# Patient Record
Sex: FEMALE | Race: WHITE | NOT HISPANIC OR LATINO | Employment: OTHER | URBAN - NONMETROPOLITAN AREA
[De-identification: names, ages, dates, MRNs, and addresses within clinical notes are randomized per-mention and may not be internally consistent; named-entity substitution may affect disease eponyms.]

---

## 2017-03-24 ENCOUNTER — HOSPITAL ENCOUNTER (INPATIENT)
Facility: HOSPITAL | Age: 77
LOS: 3 days | Discharge: HOME | End: 2017-03-27
Attending: INTERNAL MEDICINE | Admitting: INTERNAL MEDICINE

## 2017-03-24 DIAGNOSIS — I25.10 CORONARY ARTERY DISEASE INVOLVING NATIVE CORONARY ARTERY OF NATIVE HEART, ANGINA PRESENCE UNSPECIFIED: ICD-10-CM

## 2017-03-24 DIAGNOSIS — I21.4 NSTEMI (NON-ST ELEVATED MYOCARDIAL INFARCTION) (HCC): Primary | ICD-10-CM

## 2017-03-24 PROBLEM — I10 HTN (HYPERTENSION): Status: ACTIVE | Noted: 2017-03-24

## 2017-03-24 PROBLEM — I48.0 PAF (PAROXYSMAL ATRIAL FIBRILLATION) (HCC): Status: ACTIVE | Noted: 2017-03-24

## 2017-03-24 PROBLEM — J44.9 COPD (CHRONIC OBSTRUCTIVE PULMONARY DISEASE) (HCC): Status: ACTIVE | Noted: 2017-03-24

## 2017-03-24 PROBLEM — J06.9 URI (UPPER RESPIRATORY INFECTION): Status: ACTIVE | Noted: 2017-03-24

## 2017-03-24 LAB
ANION GAP SERPL CALCULATED.3IONS-SCNC: 13 MMOL/L (ref 5–15)
ARTICHOKE IGE QN: <30 MG/DL (ref 1–129)
BUN BLD-MCNC: 20 MG/DL (ref 7–21)
BUN/CREAT SERPL: 26 (ref 7–25)
CALCIUM SPEC-SCNC: 8.5 MG/DL (ref 8.4–10.2)
CHLORIDE SERPL-SCNC: 101 MMOL/L (ref 95–110)
CHOLEST SERPL-MCNC: 80 MG/DL (ref 0–199)
CK MB SERPL-CCNC: 11.7 NG/ML (ref 0–5)
CK SERPL-CCNC: 76 U/L (ref 30–135)
CO2 SERPL-SCNC: 25 MMOL/L (ref 22–31)
CREAT BLD-MCNC: 0.77 MG/DL (ref 0.5–1)
DEPRECATED RDW RBC AUTO: 43.8 FL (ref 36.4–46.3)
ERYTHROCYTE [DISTWIDTH] IN BLOOD BY AUTOMATED COUNT: 13.4 % (ref 11.5–14.5)
GFR SERPL CREATININE-BSD FRML MDRD: 73 ML/MIN/1.73 (ref 60–90)
GLUCOSE BLD-MCNC: 109 MG/DL (ref 60–100)
HBA1C MFR BLD: 6.26 % (ref 4–5.6)
HCT VFR BLD AUTO: 36.2 % (ref 35–45)
HDLC SERPL-MCNC: 43 MG/DL (ref 60–200)
HGB BLD-MCNC: 11.9 G/DL (ref 12–15.5)
LDLC/HDLC SERPL: ABNORMAL {RATIO} (ref 0–3.22)
MCH RBC QN AUTO: 29.8 PG (ref 26.5–34)
MCHC RBC AUTO-ENTMCNC: 32.9 G/DL (ref 31.4–36)
MCV RBC AUTO: 90.7 FL (ref 80–98)
NT-PROBNP SERPL-MCNC: 2670 PG/ML (ref 0–1800)
PLATELET # BLD AUTO: 194 10*3/MM3 (ref 150–450)
PMV BLD AUTO: 10.3 FL (ref 8–12)
POTASSIUM BLD-SCNC: 4.1 MMOL/L (ref 3.5–5.1)
RBC # BLD AUTO: 3.99 10*6/MM3 (ref 3.77–5.16)
SODIUM BLD-SCNC: 139 MMOL/L (ref 137–145)
TRIGL SERPL-MCNC: 161 MG/DL (ref 20–199)
TROPONIN I SERPL-MCNC: 1.45 NG/ML
TROPONIN I SERPL-MCNC: 1.94 NG/ML
TROPONIN I SERPL-MCNC: 2.29 NG/ML
WBC NRBC COR # BLD: 11.38 10*3/MM3 (ref 3.2–9.8)

## 2017-03-24 PROCEDURE — 80061 LIPID PANEL: CPT | Performed by: INTERNAL MEDICINE

## 2017-03-24 PROCEDURE — 93010 ELECTROCARDIOGRAM REPORT: CPT | Performed by: INTERNAL MEDICINE

## 2017-03-24 PROCEDURE — 85027 COMPLETE CBC AUTOMATED: CPT | Performed by: INTERNAL MEDICINE

## 2017-03-24 PROCEDURE — 83036 HEMOGLOBIN GLYCOSYLATED A1C: CPT | Performed by: INTERNAL MEDICINE

## 2017-03-24 PROCEDURE — 82550 ASSAY OF CK (CPK): CPT | Performed by: INTERNAL MEDICINE

## 2017-03-24 PROCEDURE — 82553 CREATINE MB FRACTION: CPT | Performed by: INTERNAL MEDICINE

## 2017-03-24 PROCEDURE — 93005 ELECTROCARDIOGRAM TRACING: CPT | Performed by: INTERNAL MEDICINE

## 2017-03-24 PROCEDURE — 25010000002 ENOXAPARIN PER 10 MG: Performed by: INTERNAL MEDICINE

## 2017-03-24 PROCEDURE — 80048 BASIC METABOLIC PNL TOTAL CA: CPT | Performed by: INTERNAL MEDICINE

## 2017-03-24 PROCEDURE — 83880 ASSAY OF NATRIURETIC PEPTIDE: CPT | Performed by: INTERNAL MEDICINE

## 2017-03-24 PROCEDURE — 84484 ASSAY OF TROPONIN QUANT: CPT | Performed by: INTERNAL MEDICINE

## 2017-03-24 RX ORDER — AMOXICILLIN AND CLAVULANATE POTASSIUM 875; 125 MG/1; MG/1
1 TABLET, FILM COATED ORAL 2 TIMES DAILY
COMMUNITY
Start: 2017-03-18 | End: 2017-03-27 | Stop reason: HOSPADM

## 2017-03-24 RX ORDER — ATORVASTATIN CALCIUM 20 MG/1
20 TABLET, FILM COATED ORAL NIGHTLY
COMMUNITY

## 2017-03-24 RX ORDER — MORPHINE SULFATE 2 MG/ML
1 INJECTION, SOLUTION INTRAMUSCULAR; INTRAVENOUS EVERY 4 HOURS PRN
Status: DISCONTINUED | OUTPATIENT
Start: 2017-03-24 | End: 2017-03-27 | Stop reason: HOSPADM

## 2017-03-24 RX ORDER — TRAZODONE HYDROCHLORIDE 100 MG/1
100 TABLET ORAL NIGHTLY
COMMUNITY

## 2017-03-24 RX ORDER — BUDESONIDE AND FORMOTEROL FUMARATE DIHYDRATE 160; 4.5 UG/1; UG/1
2 AEROSOL RESPIRATORY (INHALATION)
COMMUNITY
End: 2018-10-31

## 2017-03-24 RX ORDER — ASPIRIN 81 MG/1
81 TABLET ORAL DAILY
Status: DISCONTINUED | OUTPATIENT
Start: 2017-03-24 | End: 2017-03-27 | Stop reason: HOSPADM

## 2017-03-24 RX ORDER — TRAZODONE HYDROCHLORIDE 100 MG/1
100 TABLET ORAL NIGHTLY
Status: DISCONTINUED | OUTPATIENT
Start: 2017-03-24 | End: 2017-03-27 | Stop reason: HOSPADM

## 2017-03-24 RX ORDER — POTASSIUM CITRATE 10 MEQ/1
10 TABLET, EXTENDED RELEASE ORAL 2 TIMES DAILY
COMMUNITY

## 2017-03-24 RX ORDER — BUDESONIDE AND FORMOTEROL FUMARATE DIHYDRATE 160; 4.5 UG/1; UG/1
2 AEROSOL RESPIRATORY (INHALATION)
Status: DISCONTINUED | OUTPATIENT
Start: 2017-03-24 | End: 2017-03-27 | Stop reason: HOSPADM

## 2017-03-24 RX ORDER — AMOXICILLIN AND CLAVULANATE POTASSIUM 875; 125 MG/1; MG/1
1 TABLET, FILM COATED ORAL EVERY 12 HOURS SCHEDULED
Status: COMPLETED | OUTPATIENT
Start: 2017-03-24 | End: 2017-03-26

## 2017-03-24 RX ORDER — NALOXONE HCL 0.4 MG/ML
0.4 VIAL (ML) INJECTION
Status: DISCONTINUED | OUTPATIENT
Start: 2017-03-24 | End: 2017-03-27 | Stop reason: HOSPADM

## 2017-03-24 RX ORDER — PREDNISONE 10 MG/1
10 TABLET ORAL DAILY
COMMUNITY
Start: 2017-03-18 | End: 2017-03-27 | Stop reason: HOSPADM

## 2017-03-24 RX ORDER — ASPIRIN 81 MG/1
81 TABLET ORAL DAILY
COMMUNITY
End: 2018-10-31

## 2017-03-24 RX ORDER — ATORVASTATIN CALCIUM 20 MG/1
20 TABLET, FILM COATED ORAL NIGHTLY
Status: DISCONTINUED | OUTPATIENT
Start: 2017-03-24 | End: 2017-03-24

## 2017-03-24 RX ORDER — POTASSIUM CITRATE 10 MEQ/1
10 TABLET, EXTENDED RELEASE ORAL DAILY
Status: DISCONTINUED | OUTPATIENT
Start: 2017-03-24 | End: 2017-03-27 | Stop reason: HOSPADM

## 2017-03-24 RX ORDER — SODIUM CHLORIDE 0.9 % (FLUSH) 0.9 %
1-10 SYRINGE (ML) INJECTION AS NEEDED
Status: DISCONTINUED | OUTPATIENT
Start: 2017-03-24 | End: 2017-03-27 | Stop reason: HOSPADM

## 2017-03-24 RX ORDER — ATORVASTATIN CALCIUM 40 MG/1
40 TABLET, FILM COATED ORAL NIGHTLY
Status: DISCONTINUED | OUTPATIENT
Start: 2017-03-24 | End: 2017-03-27 | Stop reason: HOSPADM

## 2017-03-24 RX ORDER — ALBUTEROL SULFATE 90 UG/1
2 AEROSOL, METERED RESPIRATORY (INHALATION) EVERY 4 HOURS PRN
COMMUNITY
End: 2018-10-31

## 2017-03-24 RX ORDER — PREDNISONE 10 MG/1
10 TABLET ORAL DAILY
Status: DISCONTINUED | OUTPATIENT
Start: 2017-03-24 | End: 2017-03-27 | Stop reason: HOSPADM

## 2017-03-24 RX ADMIN — AMOXICILLIN AND CLAVULANATE POTASSIUM 1 TABLET: 875; 125 TABLET, FILM COATED ORAL at 08:35

## 2017-03-24 RX ADMIN — ATORVASTATIN CALCIUM 40 MG: 40 TABLET, FILM COATED ORAL at 21:03

## 2017-03-24 RX ADMIN — AMOXICILLIN AND CLAVULANATE POTASSIUM 1 TABLET: 875; 125 TABLET, FILM COATED ORAL at 21:02

## 2017-03-24 RX ADMIN — TRAZODONE HYDROCHLORIDE 100 MG: 100 TABLET ORAL at 21:02

## 2017-03-24 RX ADMIN — METOPROLOL TARTRATE 25 MG: 25 TABLET ORAL at 21:03

## 2017-03-24 RX ADMIN — ASPIRIN 81 MG: 81 TABLET, COATED ORAL at 08:35

## 2017-03-24 RX ADMIN — ENOXAPARIN SODIUM 70 MG: 80 INJECTION SUBCUTANEOUS at 08:36

## 2017-03-24 RX ADMIN — PREDNISONE 10 MG: 10 TABLET ORAL at 08:39

## 2017-03-24 RX ADMIN — BUDESONIDE AND FORMOTEROL FUMARATE DIHYDRATE 2 PUFF: 160; 4.5 AEROSOL RESPIRATORY (INHALATION) at 21:02

## 2017-03-24 RX ADMIN — POTASSIUM CITRATE 10 MEQ: 10 TABLET ORAL at 08:38

## 2017-03-24 RX ADMIN — Medication 12.5 MG: at 08:36

## 2017-03-24 RX ADMIN — BUDESONIDE AND FORMOTEROL FUMARATE DIHYDRATE 2 PUFF: 160; 4.5 AEROSOL RESPIRATORY (INHALATION) at 08:36

## 2017-03-24 NOTE — H&P
[facility] Medicine Admission      Date of Admission: 3/24/2017      Primary Care Physician: Abdoul Castro MD    Chief Complaint: chest pain     HPI:This ia 76 y old white female who was sent to us from [facility] for  Elevated troponins   She went to their facility due to chest pain 10/10 in intensity .pain started the day of the admission  With  Radiation to her shoulders ,she describes it as a stabbing pain   Pain resolved with NTG  She was found to be in rapid a fib got some IV cardizem and converted to sinus rhythm however her troponin was elevated .she does have a hx of CAD and had a CABG in the past around 1990       Past Medical History: CAD,COPD ,HTN,DEpression ,HLD    Past Surgical History: CABG    Family History: Positive for CAD and HTN    Social History:  does not smoke  Quit 40 y ago . No alcohol intake No illicit drug use   Retired   Lives with a friend     Allergies:   Allergies   Allergen Reactions   • Codeine Palpitations and Rash       Medications: Scheduled Meds:  aspirin 81 mg Oral Daily   atorvastatin 40 mg Oral Nightly   metoprolol tartrate 25 mg Oral Q12H     Continuous Infusions:   PRN Meds:.Morphine **AND** naloxone  •  sodium chloride            Review of Systems:  Review of Systems   Constitutional: Negative for activity change, appetite change, chills, diaphoresis, fatigue, fever and unexpected weight change.   HENT: Negative.  Negative for congestion, dental problem, drooling, ear discharge, ear pain, facial swelling, hearing loss, mouth sores, nosebleeds, postnasal drip, rhinorrhea, sinus pressure, sneezing, tinnitus, trouble swallowing and voice change.    Eyes: Negative for photophobia and discharge.   Respiratory: Positive for chest tightness. Negative for apnea, cough, choking, shortness of breath, wheezing and stridor.    Cardiovascular: Negative for chest pain, palpitations and leg swelling.   Gastrointestinal:  Negative for abdominal pain, anal bleeding, blood in stool, constipation, diarrhea, nausea, rectal pain and vomiting.   Endocrine: Negative for cold intolerance, heat intolerance, polydipsia, polyphagia and polyuria.   Genitourinary: Negative for dysuria, enuresis, frequency, hematuria and urgency.   Musculoskeletal: Negative for arthralgias, back pain, joint swelling, myalgias, neck pain and neck stiffness.   Skin: Negative for color change, pallor, rash and wound.   Allergic/Immunologic: Negative for food allergies and immunocompromised state.   Neurological: Negative for dizziness, tremors, seizures, syncope, facial asymmetry, speech difficulty, weakness, light-headedness, numbness and headaches.   Hematological: Negative for adenopathy.   Psychiatric/Behavioral: Negative for agitation, behavioral problems, confusion, hallucinations, sleep disturbance and suicidal ideas. The patient is not nervous/anxious.       Otherwise complete ROS is negative except as mentioned above.    Physical Exam:      Physical Exam   Constitutional: She is oriented to person, place, and time. She appears well-developed and well-nourished.  Non-toxic appearance. She does not have a sickly appearance. She does not appear ill. No distress. She is not intubated.   HENT:   Head: Normocephalic and atraumatic.   Right Ear: External ear normal.   Left Ear: External ear normal.   Nose: Nose normal.   Mouth/Throat: Oropharynx is clear and moist. No oropharyngeal exudate.   Eyes: Conjunctivae and EOM are normal. Pupils are equal, round, and reactive to light. Right eye exhibits no discharge and no exudate. Left eye exhibits no discharge and no exudate. Right conjunctiva is not injected. Right conjunctiva has no hemorrhage. Left conjunctiva is not injected. Left conjunctiva has no hemorrhage. No scleral icterus.   Neck: Normal range of motion. Neck supple. No hepatojugular reflux and no JVD present. No tracheal tenderness, no spinous process  tenderness and no muscular tenderness present. Carotid bruit is not present. No rigidity. No tracheal deviation, no edema, no erythema and normal range of motion present. No thyroid mass and no thyromegaly present.   Cardiovascular: Normal rate, regular rhythm, normal heart sounds and intact distal pulses.   No extrasystoles are present. Exam reveals no gallop and no friction rub.    No murmur heard.  Pulmonary/Chest: Effort normal and breath sounds normal. No stridor. She is not intubated. No respiratory distress. She has no decreased breath sounds. She has no wheezes. She has no rhonchi. She has no rales. She exhibits no tenderness.   Abdominal: Soft. Normal appearance and bowel sounds are normal. She exhibits no shifting dullness, no distension, no pulsatile liver, no fluid wave, no abdominal bruit, no ascites, no pulsatile midline mass and no mass. There is no hepatosplenomegaly, splenomegaly or hepatomegaly. There is no tenderness. There is no rigidity, no rebound, no guarding, no CVA tenderness, no tenderness at McBurney's point and negative Mullins's sign. No hernia.   Genitourinary: Rectal exam shows guaiac negative stool.   Musculoskeletal: Normal range of motion. She exhibits no edema, tenderness or deformity.        Right shoulder: She exhibits no swelling.      Skin Integrity  -   She hasno right foot ulcer, non-callous right foot, no right foot warmth and no right foot blister. She has no left foot ulcer, non-callous left foot, no left foot warmth and no left foot blister..  Lymphadenopathy:     She has no cervical adenopathy.     She has no axillary adenopathy.   Neurological: She is alert and oriented to person, place, and time. She has normal strength and normal reflexes. She is not disoriented. She displays no atrophy, no tremor and normal reflexes. No cranial nerve deficit or sensory deficit. She exhibits normal muscle tone. She displays no seizure activity. Coordination and gait normal. She  displays no Babinski's sign on the right side. She displays no Babinski's sign on the left side.   Skin: Skin is warm and dry. No abrasion, no bruising, no burn, no ecchymosis, no laceration, no lesion, no purpura and no rash noted. Rash is not macular, not papular, not maculopapular, not nodular, not pustular, not vesicular and not urticarial. She is not diaphoretic. No cyanosis or erythema. No pallor. Nails show no clubbing.   Psychiatric: She has a normal mood and affect. Her behavior is normal. Judgment and thought content normal. Her mood appears not anxious. Her affect is not angry, not blunt, not labile and not inappropriate. Her speech is not slurred. She is not agitated, not aggressive, not hyperactive, not slowed, not withdrawn and not combative. Thought content is not paranoid and not delusional. Cognition and memory are not impaired. She does not express impulsivity or inappropriate judgment. She does not exhibit a depressed mood. She expresses no homicidal and no suicidal ideation. She expresses no suicidal plans and no homicidal plans. She is communicative. She exhibits normal recent memory and normal remote memory.         Results Reviewed:  I have personally reviewed current lab, radiology, and data and agree with results.  Lab Results (last 24 hours)     ** No results found for the last 24 hours. **        Imaging Results (last 24 hours)     ** No results found for the last 24 hours. **            Assessment:    NSTEMI  RApid afib  Now in sinus rhythm  Copd   Hx of CAD  htn  HLD          Plan:  Admit to telemtry   Aspirin   metoprolol po   1 dose of lovenox  Cardiology consult   Repeat EKG  Echo  3 sets of cardiac enzymes  Morphine sulfate for pain   Will continue rest of her medicine as the medical course dictates       I discussed the patients findings and my recommendations with: patient     Sam Pina MD  03/24/17  3:58 AM

## 2017-03-24 NOTE — CONSULTS
Cardiology Consultation Note.        Patient Name: Gabrielle Cherry  Age/Sex: 76 y.o. female  : 1940  MRN: [id number]    Date of consultation: 3/24/2017  Consulting Physician: Scott Combs MD  Primary care Physician: Abdoul Castro MD  Requesting Physician:   Sam Pina MD  Reason for consultation:  Chest pain abnormal resting electrocardiogram positive troponin suggestive for non-Q myocardial infarction.  With history of coronary artery bypass grafting.  And paroxysmal A. fib      Subjective:       Chief Complaint: Chest pain    History of Present Illness:  Gabrielle Cherry is a 76 y.o. female     Body mass index is 36.38 kg/(m^2). with a past medical history significant for atherosclerotic coronary artery disease, aborted myocardial infarction in  with a rescue PTCA, coronary artery bypass grafting done in  in [place] with four-vessel bypass grafting, arterial hypertension, hypertensive heart disease, rheumatoid arthritis, chronic obstructive lung disease with previous tobacco abuse, anxiety depression, arthritis,.  Patient had relocated from [facility] in 2016.  Patient has had previous cardiac evaluation in [place].  Patient last coronary angiogram done was 2 years ago.  Patient had not had any recent cardiac intervention.    Patient presented to [facility] with symptoms of chest pain.  Patient was scheduled for outpatient evaluation on Friday.  Prior to the outpatient cardiac evaluation and patient on Thursday night had symptoms of substernal chest discomfort.  Patient describes the chest pain which was worse than what she had experienced prior to her bypass grafting.  Patient on initial presentation to [facility] was found to be in atrial fibrillation which had spontaneously converted to normal sinus rhythm.  Patient was found to have a positive troponin was subsequently transferred to [facility].    Patient resting  electrocardiogram done reveals sinus rhythm with lateral ST-T wave changes.    Patient on questioning complained of having symptoms of chest pain shortness of breath.  Patient denies any symptoms of palpitation.  Patient denies any previous history of documented atrial fibrillation.  In symptom complex patient was hospitalized.  Patient subsequently had repeat troponin which was trending down and patient had not had any further recurrent symptoms of chest pain.    Patient 10 point review of system except for what is stated in the history of present illness is negative        Past Medical History:  1.  Chest pain abnormal resting electrocardiogram with elevated troponin suggestive for non-Q myocardial infarction.  2.  Atherosclerotic coronary artery disease.  3.  Aborted myocardial infarction in 1999 done in [facility]  4.  Coronary artery bypass grafting done in 2004 with four-vessel bypass grafting records of which are unavailable at the present time  5.  Arterial hypertension.  6.  Hypertensive heart disease.  7.  Rheumatoid arthritis.  8.  Paroxysmal atrial fibrillation currently in normal sinus rhythm.  9.  Previous history of tobacco and heavy alcohol abuse.  10.  Arthritis.  11.  Hyperlipidemia.    Past Surgical History:  1.  Coronary artery bypass grafting done in 2004 in [facility] with four-vessel bypass grafting.  2.  Right total hip arthroplasty.  3.  Right total knee replacement.  4.  Bilateral rotator cuff repair.  5.  Colonoscopy.    Family History: Family history strongly positive for atherosclerotic coronary artery disease cerebrovascular accident and diabetes.  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  Social History     Social History   • Marital status:      Spouse name: N/A   • Number of children: N/A   • Years of education: N/A     Occupational History   • Not on file.     Social History Main Topics   • Smoking status: Former Smoker     Packs/day: 0.25     Years: 15.00     Types: Cigarettes     Quit date: 3/24/1980   • Smokeless tobacco: Not on file   • Alcohol use No      Comment: Use to drink but had treatment and stopped 1970s   • Drug use: No   • Sexual activity: No     Other Topics Concern   • Not on file     Social History Narrative   • No narrative on file        Cardiac Risk factor: Post menopausal., Hypertension, Hyperlipidemia, Tobacco abuse, Family history  and Obesity      Allergies:  Allergies   Allergen Reactions   • Codeine Palpitations and Rash   • Iodinated Diagnostic Agents Shortness Of Breath and Palpitations       Medication::  Prescriptions Prior to Admission   Medication Sig Dispense Refill Last Dose   • amoxicillin-clavulanate (AUGMENTIN) 875-125 MG per tablet Take 1 tablet by mouth 2 (Two) Times a Day. States was given by lung specialist, Dr. Aguila   3/23/2017 at Unknown time   • aspirin 81 MG EC tablet Take 81 mg by mouth Daily.   3/23/2017 at Unknown time   • atorvastatin (LIPITOR) 20 MG tablet Take 20 mg by mouth Every Night. Take 2 tablets every night for cholesterol   3/23/2017 at Unknown time   • budesonide-formoterol (SYMBICORT) 160-4.5 MCG/ACT inhaler Inhale 2 puffs 2 (Two) Times a Day.   3/23/2017 at Unknown time   • metoprolol tartrate (LOPRESSOR) 25 MG tablet Take 25 mg by mouth Daily. 1/2 tablet   3/23/2017 at Unknown time   • potassium citrate (UROCIT-K) 10 MEQ (1080 MG) CR tablet Take 10 mEq by mouth Daily. States for kidney stones.  Ordered twice daily but states was only taken once daily   3/23/2017 at Unknown time   • predniSONE (DELTASONE) 10 MG tablet Take 10 mg by mouth Daily. Take 4 tablets every day for 3 days, then 3 tablets everyday for 3  days,  2 tablets for 3 days, then 1 tablet every day for 3 days   3/23/2017 at Unknown time   • traZODone (DESYREL) 100 MG tablet Take 100 mg by mouth Every Night. States for depression and sleep aide   3/23/2017 at Unknown time   • albuterol (PROVENTIL HFA;VENTOLIN HFA) 108 (90 BASE) MCG/ACT inhaler Inhale 2 puffs Every 4 (Four) Hours As Needed for Wheezing.   Unknown at Unknown time           Review of Systems:       Constitutional:  Denies recent weight loss, weight gain, fever or chills, no change in exercise tolerance     HENT:  Denies any hearing loss, epistaxis, hoarseness, or difficulty speaking.     Eyes: Wears eyeglasses or contact lenses     Respiratory:  Denies dyspnea with exertion,no cough, wheezing, or hemoptysis.     Cardiovascular: Positive for chest pain.  Negative for palpations, orthopnea, PND, peripheral edema, syncope, or claudication.     Gastrointestinal:  Denies change in bowel habits, dyspepsia, ulcer disease, hematochezia, or melena.  No nausea, no vomiting, no hematemesis, no diarrhea or constipation, no melena      Endocrine: Negative for cold intolerance, heat intolerance, polydipsia, polyphagia and polyuria. Denies any history of weight change, heat/cold intolerance, polydipsia, polyuria     Genitourinary: Negative hor hematuria.      Musculoskeletal: Denies any history of arthritic symptoms or back problems .  No joint pain, joint stiffness, joint swelling, muscle pain, muscle weakness and neck pain    Skin:  Denies any change in hair or nails, rashes, or skin lesions.     Allergic/Immunologic: Negative.  Negative for environmental allergies, food allergies and immunocompromised state.     Neurological:  Denies any history of recurrent headaches, strokes, TIA, or seizure disorder.     Hematological: Denies excessive bleeding, easy bruising, fatigue, lymphadenopathy and petechiae or any bleeding disorders, or lymphadenopathy.     Psychiatric/Behavioral: Denies any history of  depression, substance abuse, or change in cognitive function. Denies any psychomotor reaction or tangential thought.  No depression, homicidal ideations and suicidal ideations    Endocrine: No frequent urination and nocturia, temperature intolerance, weight gain, unintended and weight loss, unintended            Objective:     Objective:  Vitals:    03/24/17 0737   BP: 164/68   Pulse: 70   Resp: 18   Temp: 97.5 °F (36.4 °C)   SpO2: 96%     .    Body mass index is 36.38 kg/(m^2).           Physical Exam:   General Appearance:    Alert, oriented, cooperative, in no acute distress   Head:    Normocephalic, atraumatic, without obvious abnormality   Eyes:           RON  Lids and lashes normal, conjunctivae and sclerae normal, no icterus, no pallor   Ears:    Ears appear intact with no abnormalities noted   Throat:   Mucous membranes pink and moist   Neck:   Supple, trachea midline, no carotid bruit, no organomegaly or JVD   Lungs:     Clear to auscultation and percussion, respirations regular, even and Unlabored. No wheezes, rales, rhonchi    Heart:    Regular rhythm and normal rate, normal S1 and S2, no            murmur, no gallop, no rub, no click   Abdomen:     Soft, non-tender, non-distended, no guarding, no rebound tenderness, Normal bowel sounds in all four quadrant, no masses, liver and spleen nonpalpable,    Genitalia:    Deferred   Extremities:   Moves all extremities well, no edema, no cyanosis, no              Redness, no clubbing   Pulses:   Pulses palpable and equal bilaterally   Skin:   Moist and warm. No bleeding, bruising or rash   Neurologic/Psychiatric:   Alert and oriented to person, place, and time.  Motor, power and tone in upper and lower extremity is grossly intact.  No focal neurological deficits. Normal cognitive function. No psychomotor reaction or tangential thought. No depression, homicidal ideations and suicidal ideations           Lab Review:       Results from last 7 days  Lab Units  03/24/17  0718   SODIUM mmol/L 139   POTASSIUM mmol/L 4.1   CHLORIDE mmol/L 101   TOTAL CO2 mmol/L 25.0   BUN mg/dL 20   CREATININE mg/dL 0.77   CALCIUM mg/dL 8.5   GLUCOSE mg/dL 109*       Results from last 7 days  Lab Units 03/24/17  0718   CK TOTAL U/L 76   TROPONIN I ng/mL 2.290*           Results from last 7 days  Lab Units 03/24/17  0641   WBC 10*3/mm3 11.38*   HEMOGLOBIN g/dL 11.9*   HEMATOCRIT % 36.2   PLATELETS 10*3/mm3 194               Results from last 7 days  Lab Units 03/24/17  0718   CHOLESTEROL mg/dL 80   TRIGLYCERIDES mg/dL 161   HDL CHOL mg/dL 43*           Invalid input(s):  T4,  FREET4    EKG:   ECG/EMG Results (last 24 hours)     Procedure Component Value Units Date/Time    ECG 12 Lead [[id number]] Collected:  03/24/17 0348     Updated:  03/24/17 0727    Narrative:       Test Reason : EKGBlood Pressure : **/** mmHGVent. Rate : 062 BPM     Atrial Rate : 062 BPM   P-R Int : 126 ms          QRS Dur : 076 ms    QT Int : 380 ms       P-R-T Axes : 068 041 050 degrees   QTc Int : 385 ms** Poor data quality, interpretation may   be adversely affectedSinus rhythm with premature ventricular complexes or fusion complexesST &  T wave abnormality, consider lateral ischemiaAbnormal ECGNo previous ECGs availableReferred By:  RN           Confirmed By:           Imaging:  Imaging Results (last 24 hours)     ** No results found for the last 24 hours. **          I personally viewed and interpreted the patient's EKG/Telemetry data.    Assessment:   1.  Chest pain with elevated troponin suggestive for non-Q myocardial infarction.  2.  Atherosclerotic coronary artery disease status post coronary artery bypass grafting done in 2004 with four-vessel bypass grafting in [facility]  3.  Arterial hypertension.  4.  Hypertensive heart disease.  5.  Hyperlipidemia.  6.  Paroxysmal atrial fibrillation currently in sinus rhythm  7.  Rheumatoid arthritis.        Plan:   1.  Chest pain.  Patient does have history of  documented atherosclerotic coronary artery disease with aborted myocardial infarction in 1999 with a rescue PTCA with subsequent restenosis in 2004 with diffuse triple-vessel coronary artery disease with four-vessel coronary artery bypass grafting done in [facility].  Patient now presents with symptoms of paroxysmal atrial fibrillation and chest pain with positive troponin.  Patient troponin had peaked to 2.6 with subsequent troponin I 1.4.  Patient resting electrocardiogram revealed lateral ST-T wave changes.  Patient at the present time has been recommended to undergo a coronary angiogram.  Patient is allergic to IVP dye and would be premedicated for the IVP dye allergy.  Risk-benefit treatment option for the coronary angiogram were discussed with the patient and an informed consent was obtained from the patient for the coronary angiogram.  Patient Mallampati score #2 patient ASA classification was #2.  Rest for minimal sedation was also discussed with the patient and an informed consent was obtained from the patient for the minimal sedation.  Due to the patient history of IVP dye allergy patient would be premedicated with Benadryl and 100 of hydrocortisone prior to the coronary angiogram  2.  Arterial hypertension.  Patient blood pressure is currently well controlled and patient would be continued on the present dose of the metoprolol.  Patient has been counseled to decrease his salt intake.  3.  Hypertensive heart disease.  Clinically at the present time patient is not in congestive heart failure.    4.  Hyperlipidemia.  Patient is currently on Lipitor 40 mg at bedtime.  5.  Paroxysmal atrial fibrillation.  Patient on initial presentation to [facility] was found to be in atrial fibrillation which had converted to normal sinus rhythm.  Patient has not complained of having any symptoms of palpitation.  Should the patient have recurrence of atrial fibrillation patient would be reevaluated for  consideration for anticoagulation.  6.  Rheumatoid arthritis.  Patient has been followed by the primary care physician.  7.  Obesity.  Patient has been counseled on weight reduction lifestyle modification and dietary restriction.        Time: time spent in face-to-face evaluation off greater than 60 minutes and interacting and formulating examining and discussing the plan with the patient with 50% of greater time spent in face-to-face interaction.    Scott Combs MD  03/24/17  9:36 AM      EMR Dragon/Transcription disclaimer:   Some of this note may be an electronic transcription/translation of spoken language to printed text. The electronic translation of spoken language may permit erroneous, or at times, nonsensical words or phrases to be inadvertently transcribed; Although I have reviewed the note for such errors, some may still exist.

## 2017-03-24 NOTE — PLAN OF CARE
Problem: Older Inpatient, Acutely Ill (Adult)  Goal: Signs and Symptoms of Listed Potential Problems Will be Absent or Manageable (Older Inpatient, Acutely Ill)  Outcome: Ongoing (interventions implemented as appropriate)

## 2017-03-24 NOTE — PROGRESS NOTES
[facility] Medicine Services  INPATIENT PROGRESS NOTE    Length of Stay: 0  Date of Admission: 3/24/2017  Primary Care Physician: Abdoul Castro MD    Subjective   Chief Complaint: Chest pain, currently pain free  HPI:  76 year old female transferred from [facility] with chest pain radiating to both shoulders with elevation in her troponin.      Review of Systems   Constitutional: Negative for chills and fever.   Respiratory: Positive for cough. Negative for shortness of breath and wheezing.    Cardiovascular: Negative for chest pain, palpitations and leg swelling.   Gastrointestinal: Negative for abdominal pain, diarrhea, nausea and vomiting.   Neurological: Negative for headaches.      All pertinent negatives and positives are as above. All other systems have been reviewed and are negative unless otherwise stated.     Objective    Temp:  [97.5 °F (36.4 °C)-97.9 °F (36.6 °C)] 97.5 °F (36.4 °C)  Heart Rate:  [61-70] 70  Resp:  [18-20] 18  BP: (146-164)/(68-75) 164/68    Physical Exam   Constitutional: She is oriented to person, place, and time. She appears well-developed and well-nourished.   HENT:   Head: Normocephalic and atraumatic.   Eyes: Conjunctivae and EOM are normal.   Neck: Normal range of motion. Neck supple.   Cardiovascular: Normal rate, regular rhythm, normal heart sounds and intact distal pulses.  Exam reveals no gallop and no friction rub.    No murmur heard.  Pulmonary/Chest: Effort normal and breath sounds normal. No respiratory distress. She has no wheezes. She has no rales.   Abdominal: Soft. Bowel sounds are normal. She exhibits no distension. There is no tenderness.   Musculoskeletal: Normal range of motion. She exhibits no edema.   Neurological: She is alert and oriented to person, place, and time.   Skin: Skin is warm and dry. No erythema.       Results Review:  I have reviewed the labs, radiology results, and diagnostic  studies.    Laboratory Data:     Results from last 7 days  Lab Units 03/24/17  0718   SODIUM mmol/L 139   POTASSIUM mmol/L 4.1   CHLORIDE mmol/L 101   TOTAL CO2 mmol/L 25.0   BUN mg/dL 20   CREATININE mg/dL 0.77   GLUCOSE mg/dL 109*   CALCIUM mg/dL 8.5   ANION GAP mmol/L 13.0     Estimated Creatinine Clearance: 50.9 mL/min (by C-G formula based on Cr of 0.77).            Results from last 7 days  Lab Units 03/24/17  0641   WBC 10*3/mm3 11.38*   HEMOGLOBIN g/dL 11.9*   HEMATOCRIT % 36.2   PLATELETS 10*3/mm3 194           Culture Data:   No results found for: BLOODCX  No results found for: URINECX  No results found for: RESPCX  No results found for: WOUNDCX  No results found for: STOOLCX  No components found for: BODYFLD    Radiology Data:   Imaging Results (last 24 hours)     ** No results found for the last 24 hours. **          I have reviewed the patient current medications.     Assessment/Plan     Hospital Problem List     * (Principal)NSTEMI (non-ST elevated myocardial infarction)    CAD (coronary artery disease)    COPD (chronic obstructive pulmonary disease)    HTN (hypertension)    PAF (paroxysmal atrial fibrillation)    URI (upper respiratory infection)          Plan:  ASA  Lopressor  Lipitor  Therapeutic lovenox  Cardiology consulted  Augmentin/Prednisone for URI/bronchitis      MANUEL Warren   03/24/17   11:41 AM      I personally evaluated and examined the patient in conjunction with MANUEL Carbajal and agree with the assessment, treatment plan, and disposition of the patient as recorded.    Chest pain resolved.  Lungs clear

## 2017-03-25 LAB
ANION GAP SERPL CALCULATED.3IONS-SCNC: 13 MMOL/L (ref 5–15)
BASOPHILS # BLD AUTO: 0 10*3/MM3 (ref 0–0.2)
BASOPHILS NFR BLD AUTO: 0 % (ref 0–2)
BUN BLD-MCNC: 22 MG/DL (ref 7–21)
BUN/CREAT SERPL: 27.5 (ref 7–25)
CALCIUM SPEC-SCNC: 8.8 MG/DL (ref 8.4–10.2)
CHLORIDE SERPL-SCNC: 101 MMOL/L (ref 95–110)
CO2 SERPL-SCNC: 25 MMOL/L (ref 22–31)
CREAT BLD-MCNC: 0.8 MG/DL (ref 0.5–1)
DEPRECATED RDW RBC AUTO: 45.3 FL (ref 36.4–46.3)
EOSINOPHIL # BLD AUTO: 0.01 10*3/MM3 (ref 0–0.7)
EOSINOPHIL NFR BLD AUTO: 0.1 % (ref 0–7)
ERYTHROCYTE [DISTWIDTH] IN BLOOD BY AUTOMATED COUNT: 13.8 % (ref 11.5–14.5)
GFR SERPL CREATININE-BSD FRML MDRD: 70 ML/MIN/1.73 (ref 39–90)
GLUCOSE BLD-MCNC: 93 MG/DL (ref 60–100)
HCT VFR BLD AUTO: 41.5 % (ref 35–45)
HGB BLD-MCNC: 13.8 G/DL (ref 12–15.5)
IMM GRANULOCYTES # BLD: 0.12 10*3/MM3 (ref 0–0.02)
IMM GRANULOCYTES NFR BLD: 1 % (ref 0–0.5)
LYMPHOCYTES # BLD AUTO: 1.49 10*3/MM3 (ref 0.6–4.2)
LYMPHOCYTES NFR BLD AUTO: 12.2 % (ref 10–50)
MCH RBC QN AUTO: 30.4 PG (ref 26.5–34)
MCHC RBC AUTO-ENTMCNC: 33.3 G/DL (ref 31.4–36)
MCV RBC AUTO: 91.4 FL (ref 80–98)
MONOCYTES # BLD AUTO: 0.82 10*3/MM3 (ref 0–0.9)
MONOCYTES NFR BLD AUTO: 6.7 % (ref 0–12)
NEUTROPHILS # BLD AUTO: 9.76 10*3/MM3 (ref 2–8.6)
NEUTROPHILS NFR BLD AUTO: 80 % (ref 37–80)
NRBC BLD MANUAL-RTO: 0 /100 WBC (ref 0–0)
PLATELET # BLD AUTO: 237 10*3/MM3 (ref 150–450)
PMV BLD AUTO: 10.1 FL (ref 8–12)
POTASSIUM BLD-SCNC: 4.6 MMOL/L (ref 3.5–5.1)
RBC # BLD AUTO: 4.54 10*6/MM3 (ref 3.77–5.16)
SODIUM BLD-SCNC: 139 MMOL/L (ref 137–145)
WBC NRBC COR # BLD: 12.2 10*3/MM3 (ref 3.2–9.8)

## 2017-03-25 PROCEDURE — 85025 COMPLETE CBC W/AUTO DIFF WBC: CPT | Performed by: NURSE PRACTITIONER

## 2017-03-25 PROCEDURE — 80048 BASIC METABOLIC PNL TOTAL CA: CPT | Performed by: NURSE PRACTITIONER

## 2017-03-25 PROCEDURE — 63710000001 PREDNISONE PER 5 MG: Performed by: INTERNAL MEDICINE

## 2017-03-25 RX ADMIN — ATORVASTATIN CALCIUM 40 MG: 40 TABLET, FILM COATED ORAL at 20:28

## 2017-03-25 RX ADMIN — TRAZODONE HYDROCHLORIDE 100 MG: 100 TABLET ORAL at 20:28

## 2017-03-25 RX ADMIN — PREDNISONE 10 MG: 10 TABLET ORAL at 08:47

## 2017-03-25 RX ADMIN — ASPIRIN 81 MG: 81 TABLET, COATED ORAL at 08:47

## 2017-03-25 RX ADMIN — AMOXICILLIN AND CLAVULANATE POTASSIUM 1 TABLET: 875; 125 TABLET, FILM COATED ORAL at 20:28

## 2017-03-25 RX ADMIN — AMOXICILLIN AND CLAVULANATE POTASSIUM 1 TABLET: 875; 125 TABLET, FILM COATED ORAL at 08:47

## 2017-03-25 RX ADMIN — POTASSIUM CITRATE 10 MEQ: 10 TABLET ORAL at 08:47

## 2017-03-25 NOTE — PLAN OF CARE
Problem: Older Inpatient, Acutely Ill (Adult)  Goal: Signs and Symptoms of Listed Potential Problems Will be Absent or Manageable (Older Inpatient, Acutely Ill)  Outcome: Ongoing (interventions implemented as appropriate)    Problem: Arrhythmia/Dysrhythmia (Symptomatic) (Adult)  Goal: Signs and Symptoms of Listed Potential Problems Will be Absent or Manageable (Arrhythmia/Dysrhythmia)  Outcome: Ongoing (interventions implemented as appropriate)    Problem: Patient Care Overview (Adult)  Goal: Plan of Care Review  Outcome: Ongoing (interventions implemented as appropriate)    03/24/17 0532 03/25/17 1826   Coping/Psychosocial Response Interventions   Plan Of Care Reviewed With patient --    Patient Care Overview   Progress no change --    Outcome Evaluation   Outcome Summary/Follow up Plan --  possible heart cath on Monday       Goal: Adult Individualization and Mutuality  Outcome: Ongoing (interventions implemented as appropriate)

## 2017-03-25 NOTE — PROGRESS NOTES
[facility] Medicine Services  INPATIENT PROGRESS NOTE    Length of Stay: 1  Date of Admission: 3/24/2017  Primary Care Physician: Abdoul Castro MD    Subjective   Chief Complaint: Chest pain, currently pain free  HPI:  76 year old female transferred from [facility] with chest pain radiating to both shoulders with elevation in her troponin.  She has been evaluated by cardiology and remains pain free.    Review of Systems   Constitutional: Negative for chills and fever.   Respiratory: Positive for cough. Negative for shortness of breath and wheezing.    Cardiovascular: Negative for chest pain, palpitations and leg swelling.   Gastrointestinal: Negative for abdominal pain, diarrhea, nausea and vomiting.   Neurological: Negative for headaches.      All pertinent negatives and positives are as above. All other systems have been reviewed and are negative unless otherwise stated.     Objective    Temp:  [96.6 °F (35.9 °C)-98.7 °F (37.1 °C)] 96.6 °F (35.9 °C)  Heart Rate:  [45-62] 50  Resp:  [18-20] 18  BP: (125-153)/(63-75) 125/75    Physical Exam   Constitutional: She is oriented to person, place, and time. She appears well-developed and well-nourished.   HENT:   Head: Normocephalic and atraumatic.   Eyes: Conjunctivae and EOM are normal.   Neck: Normal range of motion. Neck supple.   Cardiovascular: Normal rate, regular rhythm, normal heart sounds and intact distal pulses.  Exam reveals no gallop and no friction rub.    No murmur heard.  Pulmonary/Chest: Effort normal and breath sounds normal. No respiratory distress. She has no wheezes. She has no rales.   Abdominal: Soft. Bowel sounds are normal. She exhibits no distension. There is no tenderness.   Musculoskeletal: Normal range of motion. She exhibits no edema.   Neurological: She is alert and oriented to person, place, and time.   Skin: Skin is warm and dry. No erythema.       Results Review:  I have reviewed the  labs, radiology results, and diagnostic studies.    Laboratory Data:     Results from last 7 days  Lab Units 03/24/17  0718   SODIUM mmol/L 139   POTASSIUM mmol/L 4.1   CHLORIDE mmol/L 101   TOTAL CO2 mmol/L 25.0   BUN mg/dL 20   CREATININE mg/dL 0.77   GLUCOSE mg/dL 109*   CALCIUM mg/dL 8.5   ANION GAP mmol/L 13.0     Estimated Creatinine Clearance: 50.9 mL/min (by C-G formula based on Cr of 0.77).            Results from last 7 days  Lab Units 03/24/17  0641   WBC 10*3/mm3 11.38*   HEMOGLOBIN g/dL 11.9*   HEMATOCRIT % 36.2   PLATELETS 10*3/mm3 194           Culture Data:   No results found for: BLOODCX  No results found for: URINECX  No results found for: RESPCX  No results found for: WOUNDCX  No results found for: STOOLCX  No components found for: BODYFLD    Radiology Data:   Imaging Results (last 24 hours)     ** No results found for the last 24 hours. **          I have reviewed the patient current medications.     Assessment/Plan     Hospital Problem List     * (Principal)NSTEMI (non-ST elevated myocardial infarction)    CAD (coronary artery disease)    COPD (chronic obstructive pulmonary disease)    HTN (hypertension)    PAF (paroxysmal atrial fibrillation)    URI (upper respiratory infection)          Plan:  ASA  Lopressor  Lipitor  Therapeutic lovenox  Heart cath planned  Augmentin/Prednisone for URI/bronchitis      Keegan Benoit, APRN   03/25/17   11:17 AM

## 2017-03-25 NOTE — PLAN OF CARE
Problem: Patient Care Overview (Adult)  Goal: Plan of Care Review  Outcome: Ongoing (interventions implemented as appropriate)    03/24/17 0532 03/25/17 0535   Coping/Psychosocial Response Interventions   Plan Of Care Reviewed With patient --    Patient Care Overview   Progress no change --    Outcome Evaluation   Outcome Summary/Follow up Plan --  pt slept threw the night.

## 2017-03-26 LAB
ANION GAP SERPL CALCULATED.3IONS-SCNC: 12 MMOL/L (ref 5–15)
BASOPHILS # BLD AUTO: 0.01 10*3/MM3 (ref 0–0.2)
BASOPHILS NFR BLD AUTO: 0.1 % (ref 0–2)
BUN BLD-MCNC: 19 MG/DL (ref 7–21)
BUN/CREAT SERPL: 25.7 (ref 7–25)
CALCIUM SPEC-SCNC: 8.5 MG/DL (ref 8.4–10.2)
CHLORIDE SERPL-SCNC: 102 MMOL/L (ref 95–110)
CO2 SERPL-SCNC: 25 MMOL/L (ref 22–31)
CREAT BLD-MCNC: 0.74 MG/DL (ref 0.5–1)
DEPRECATED RDW RBC AUTO: 46.3 FL (ref 36.4–46.3)
EOSINOPHIL # BLD AUTO: 0.02 10*3/MM3 (ref 0–0.7)
EOSINOPHIL NFR BLD AUTO: 0.2 % (ref 0–7)
ERYTHROCYTE [DISTWIDTH] IN BLOOD BY AUTOMATED COUNT: 14.1 % (ref 11.5–14.5)
GFR SERPL CREATININE-BSD FRML MDRD: 76 ML/MIN/1.73 (ref 60–90)
GLUCOSE BLD-MCNC: 74 MG/DL (ref 60–100)
HCT VFR BLD AUTO: 40.4 % (ref 35–45)
HGB BLD-MCNC: 13.5 G/DL (ref 12–15.5)
IMM GRANULOCYTES # BLD: 0.11 10*3/MM3 (ref 0–0.02)
IMM GRANULOCYTES NFR BLD: 1.2 % (ref 0–0.5)
LYMPHOCYTES # BLD AUTO: 2.84 10*3/MM3 (ref 0.6–4.2)
LYMPHOCYTES NFR BLD AUTO: 30 % (ref 10–50)
MCH RBC QN AUTO: 30.3 PG (ref 26.5–34)
MCHC RBC AUTO-ENTMCNC: 33.4 G/DL (ref 31.4–36)
MCV RBC AUTO: 90.6 FL (ref 80–98)
MONOCYTES # BLD AUTO: 0.95 10*3/MM3 (ref 0–0.9)
MONOCYTES NFR BLD AUTO: 10 % (ref 0–12)
NEUTROPHILS # BLD AUTO: 5.53 10*3/MM3 (ref 2–8.6)
NEUTROPHILS NFR BLD AUTO: 58.5 % (ref 37–80)
NRBC BLD MANUAL-RTO: 0 /100 WBC (ref 0–0)
PLATELET # BLD AUTO: 231 10*3/MM3 (ref 150–450)
PMV BLD AUTO: 9.6 FL (ref 8–12)
POTASSIUM BLD-SCNC: 4.2 MMOL/L (ref 3.5–5.1)
RBC # BLD AUTO: 4.46 10*6/MM3 (ref 3.77–5.16)
SODIUM BLD-SCNC: 139 MMOL/L (ref 137–145)
WBC NRBC COR # BLD: 9.46 10*3/MM3 (ref 3.2–9.8)

## 2017-03-26 PROCEDURE — 80048 BASIC METABOLIC PNL TOTAL CA: CPT | Performed by: NURSE PRACTITIONER

## 2017-03-26 PROCEDURE — 94799 UNLISTED PULMONARY SVC/PX: CPT

## 2017-03-26 PROCEDURE — 85025 COMPLETE CBC W/AUTO DIFF WBC: CPT | Performed by: NURSE PRACTITIONER

## 2017-03-26 PROCEDURE — 63710000001 PREDNISONE PER 5 MG: Performed by: INTERNAL MEDICINE

## 2017-03-26 PROCEDURE — 94640 AIRWAY INHALATION TREATMENT: CPT

## 2017-03-26 PROCEDURE — 94760 N-INVAS EAR/PLS OXIMETRY 1: CPT

## 2017-03-26 RX ADMIN — AMOXICILLIN AND CLAVULANATE POTASSIUM 1 TABLET: 875; 125 TABLET, FILM COATED ORAL at 09:02

## 2017-03-26 RX ADMIN — POTASSIUM CITRATE 10 MEQ: 10 TABLET ORAL at 09:03

## 2017-03-26 RX ADMIN — BUDESONIDE AND FORMOTEROL FUMARATE DIHYDRATE 2 PUFF: 160; 4.5 AEROSOL RESPIRATORY (INHALATION) at 08:00

## 2017-03-26 RX ADMIN — TRAZODONE HYDROCHLORIDE 100 MG: 100 TABLET ORAL at 20:24

## 2017-03-26 RX ADMIN — ASPIRIN 81 MG: 81 TABLET, COATED ORAL at 09:02

## 2017-03-26 RX ADMIN — AMOXICILLIN AND CLAVULANATE POTASSIUM 1 TABLET: 875; 125 TABLET, FILM COATED ORAL at 20:24

## 2017-03-26 RX ADMIN — METOPROLOL TARTRATE 25 MG: 25 TABLET ORAL at 20:24

## 2017-03-26 RX ADMIN — BUDESONIDE AND FORMOTEROL FUMARATE DIHYDRATE 2 PUFF: 160; 4.5 AEROSOL RESPIRATORY (INHALATION) at 21:27

## 2017-03-26 RX ADMIN — PREDNISONE 10 MG: 10 TABLET ORAL at 09:02

## 2017-03-26 RX ADMIN — ATORVASTATIN CALCIUM 40 MG: 40 TABLET, FILM COATED ORAL at 20:24

## 2017-03-26 RX ADMIN — METOPROLOL TARTRATE 25 MG: 25 TABLET ORAL at 09:02

## 2017-03-26 NOTE — PROGRESS NOTES
[facility] Medicine Services  INPATIENT PROGRESS NOTE    Length of Stay: 2  Date of Admission: 3/24/2017  Primary Care Physician: Abdoul Castro MD    Subjective   Chief Complaint: No new complaints  HPI:  76 year old female transferred from [facility] with chest pain radiating to both shoulders with elevation in her troponin.  She has been evaluated by cardiology and remains pain free.  No new overnight issues.    Review of Systems   Constitutional: Negative for chills and fever.   Respiratory: Positive for cough. Negative for shortness of breath and wheezing.    Cardiovascular: Negative for chest pain, palpitations and leg swelling.   Gastrointestinal: Negative for abdominal pain, diarrhea, nausea and vomiting.   Neurological: Negative for headaches.      All pertinent negatives and positives are as above. All other systems have been reviewed and are negative unless otherwise stated.     Objective    Temp:  [96.7 °F (35.9 °C)-98.7 °F (37.1 °C)] 96.7 °F (35.9 °C)  Heart Rate:  [52-64] 64  Resp:  [18] 18  BP: (126-155)/(57-77) 141/61    Physical Exam   Constitutional: She is oriented to person, place, and time. She appears well-developed and well-nourished.   HENT:   Head: Normocephalic and atraumatic.   Eyes: Conjunctivae and EOM are normal.   Neck: Normal range of motion. Neck supple.   Cardiovascular: Normal rate, regular rhythm, normal heart sounds and intact distal pulses.  Exam reveals no gallop and no friction rub.    No murmur heard.  Pulmonary/Chest: Effort normal and breath sounds normal. No respiratory distress. She has no wheezes. She has no rales.   Abdominal: Soft. Bowel sounds are normal. She exhibits no distension. There is no tenderness.   Musculoskeletal: Normal range of motion. She exhibits no edema.   Neurological: She is alert and oriented to person, place, and time.   Skin: Skin is warm and dry. No erythema.       Results Review:  I have  reviewed the labs, radiology results, and diagnostic studies.    Laboratory Data:     Results from last 7 days  Lab Units 03/26/17  0610 03/25/17  1153 03/24/17  0718   SODIUM mmol/L 139 139 139   POTASSIUM mmol/L 4.2 4.6 4.1   CHLORIDE mmol/L 102 101 101   TOTAL CO2 mmol/L 25.0 25.0 25.0   BUN mg/dL 19 22* 20   CREATININE mg/dL 0.74 0.80 0.77   GLUCOSE mg/dL 74 93 109*   CALCIUM mg/dL 8.5 8.8 8.5   ANION GAP mmol/L 12.0 13.0 13.0     Estimated Creatinine Clearance: 50.4 mL/min (by C-G formula based on Cr of 0.74).            Results from last 7 days  Lab Units 03/26/17  0610 03/25/17  1153 03/24/17  0641   WBC 10*3/mm3 9.46 12.20* 11.38*   HEMOGLOBIN g/dL 13.5 13.8 11.9*   HEMATOCRIT % 40.4 41.5 36.2   PLATELETS 10*3/mm3 231 237 194           Culture Data:   No results found for: BLOODCX  No results found for: URINECX  No results found for: RESPCX  No results found for: WOUNDCX  No results found for: STOOLCX  No components found for: BODYFLD    Radiology Data:   Imaging Results (last 24 hours)     ** No results found for the last 24 hours. **          I have reviewed the patient current medications.     Assessment/Plan     Hospital Problem List     * (Principal)NSTEMI (non-ST elevated myocardial infarction)    CAD (coronary artery disease)    COPD (chronic obstructive pulmonary disease)    HTN (hypertension)    PAF (paroxysmal atrial fibrillation)    URI (upper respiratory infection)          Plan:  ASA  Lopressor  Lipitor  Heart cath planned in AM  Augmentin/Prednisone for URI/bronchitis      Keegan Benoit, APRN   03/26/17   10:52 AM

## 2017-03-26 NOTE — PLAN OF CARE
Problem: Older Inpatient, Acutely Ill (Adult)  Goal: Signs and Symptoms of Listed Potential Problems Will be Absent or Manageable (Older Inpatient, Acutely Ill)  Outcome: Ongoing (interventions implemented as appropriate)    Problem: Arrhythmia/Dysrhythmia (Symptomatic) (Adult)  Goal: Signs and Symptoms of Listed Potential Problems Will be Absent or Manageable (Arrhythmia/Dysrhythmia)  Outcome: Ongoing (interventions implemented as appropriate)    Problem: Patient Care Overview (Adult)  Goal: Plan of Care Review  Outcome: Ongoing (interventions implemented as appropriate)    03/24/17 0532 03/25/17 1826 03/26/17 0000   Coping/Psychosocial Response Interventions   Plan Of Care Reviewed With --  --  patient   Patient Care Overview   Progress no change --  --    Outcome Evaluation   Outcome Summary/Follow up Plan --  possible heart cath on Monday --

## 2017-03-26 NOTE — PLAN OF CARE
Problem: Older Inpatient, Acutely Ill (Adult)  Goal: Signs and Symptoms of Listed Potential Problems Will be Absent or Manageable (Older Inpatient, Acutely Ill)  Outcome: Ongoing (interventions implemented as appropriate)    Problem: Arrhythmia/Dysrhythmia (Symptomatic) (Adult)  Goal: Signs and Symptoms of Listed Potential Problems Will be Absent or Manageable (Arrhythmia/Dysrhythmia)  Outcome: Ongoing (interventions implemented as appropriate)    Problem: Patient Care Overview (Adult)  Goal: Plan of Care Review  Outcome: Ongoing (interventions implemented as appropriate)    03/24/17 0532 03/25/17 1826 03/26/17 0000   Coping/Psychosocial Response Interventions   Plan Of Care Reviewed With --  --  patient   Patient Care Overview   Progress no change --  --    Outcome Evaluation   Outcome Summary/Follow up Plan --  possible heart cath on Monday --        Goal: Adult Individualization and Mutuality  Outcome: Ongoing (interventions implemented as appropriate)

## 2017-03-27 VITALS
HEART RATE: 73 BPM | TEMPERATURE: 97.3 F | WEIGHT: 147 LBS | BODY MASS INDEX: 34.02 KG/M2 | OXYGEN SATURATION: 95 % | HEIGHT: 55 IN | DIASTOLIC BLOOD PRESSURE: 60 MMHG | SYSTOLIC BLOOD PRESSURE: 108 MMHG | RESPIRATION RATE: 20 BRPM

## 2017-03-27 LAB
ANION GAP SERPL CALCULATED.3IONS-SCNC: 12 MMOL/L (ref 5–15)
BASOPHILS # BLD AUTO: 0.01 10*3/MM3 (ref 0–0.2)
BASOPHILS NFR BLD AUTO: 0.1 % (ref 0–2)
BUN BLD-MCNC: 21 MG/DL (ref 7–21)
BUN/CREAT SERPL: 24.4 (ref 7–25)
CALCIUM SPEC-SCNC: 8.7 MG/DL (ref 8.4–10.2)
CHLORIDE SERPL-SCNC: 101 MMOL/L (ref 95–110)
CO2 SERPL-SCNC: 25 MMOL/L (ref 22–31)
CREAT BLD-MCNC: 0.86 MG/DL (ref 0.5–1)
DEPRECATED RDW RBC AUTO: 45.7 FL (ref 36.4–46.3)
EOSINOPHIL # BLD AUTO: 0.02 10*3/MM3 (ref 0–0.7)
EOSINOPHIL NFR BLD AUTO: 0.2 % (ref 0–7)
ERYTHROCYTE [DISTWIDTH] IN BLOOD BY AUTOMATED COUNT: 13.9 % (ref 11.5–14.5)
GFR SERPL CREATININE-BSD FRML MDRD: 64 ML/MIN/1.73 (ref 60–90)
GLUCOSE BLD-MCNC: 83 MG/DL (ref 60–100)
HCT VFR BLD AUTO: 40.1 % (ref 35–45)
HGB BLD-MCNC: 13.8 G/DL (ref 12–15.5)
IMM GRANULOCYTES # BLD: 0.11 10*3/MM3 (ref 0–0.02)
IMM GRANULOCYTES NFR BLD: 1.1 % (ref 0–0.5)
LYMPHOCYTES # BLD AUTO: 3.11 10*3/MM3 (ref 0.6–4.2)
LYMPHOCYTES NFR BLD AUTO: 30.7 % (ref 10–50)
MCH RBC QN AUTO: 31 PG (ref 26.5–34)
MCHC RBC AUTO-ENTMCNC: 34.4 G/DL (ref 31.4–36)
MCV RBC AUTO: 90.1 FL (ref 80–98)
MONOCYTES # BLD AUTO: 0.77 10*3/MM3 (ref 0–0.9)
MONOCYTES NFR BLD AUTO: 7.6 % (ref 0–12)
NEUTROPHILS # BLD AUTO: 6.11 10*3/MM3 (ref 2–8.6)
NEUTROPHILS NFR BLD AUTO: 60.3 % (ref 37–80)
PLATELET # BLD AUTO: 216 10*3/MM3 (ref 150–450)
PMV BLD AUTO: 10.2 FL (ref 8–12)
POTASSIUM BLD-SCNC: 3.8 MMOL/L (ref 3.5–5.1)
RBC # BLD AUTO: 4.45 10*6/MM3 (ref 3.77–5.16)
SODIUM BLD-SCNC: 138 MMOL/L (ref 137–145)
WBC NRBC COR # BLD: 10.13 10*3/MM3 (ref 3.2–9.8)

## 2017-03-27 PROCEDURE — C1894 INTRO/SHEATH, NON-LASER: HCPCS | Performed by: INTERNAL MEDICINE

## 2017-03-27 PROCEDURE — B2151ZZ FLUOROSCOPY OF LEFT HEART USING LOW OSMOLAR CONTRAST: ICD-10-PCS | Performed by: INTERNAL MEDICINE

## 2017-03-27 PROCEDURE — C1769 GUIDE WIRE: HCPCS | Performed by: INTERNAL MEDICINE

## 2017-03-27 PROCEDURE — 80048 BASIC METABOLIC PNL TOTAL CA: CPT | Performed by: NURSE PRACTITIONER

## 2017-03-27 PROCEDURE — 25010000002 DIPHENHYDRAMINE PER 50 MG: Performed by: INTERNAL MEDICINE

## 2017-03-27 PROCEDURE — 25010000002 HYDROCORTISONE SODIUM SUCCINATE 100 MG RECONSTITUTED SOLUTION: Performed by: INTERNAL MEDICINE

## 2017-03-27 PROCEDURE — 25010000002 MIDAZOLAM PER 1 MG: Performed by: INTERNAL MEDICINE

## 2017-03-27 PROCEDURE — 93567 NJX CAR CTH SPRVLV AORTGRPHY: CPT | Performed by: INTERNAL MEDICINE

## 2017-03-27 PROCEDURE — 93459 L HRT ART/GRFT ANGIO: CPT | Performed by: INTERNAL MEDICINE

## 2017-03-27 PROCEDURE — 0 IOPAMIDOL PER 1 ML: Performed by: INTERNAL MEDICINE

## 2017-03-27 PROCEDURE — 25010000002 HYDROMORPHONE PER 4 MG: Performed by: INTERNAL MEDICINE

## 2017-03-27 PROCEDURE — B41JZZZ FLUOROSCOPY OF OTHER LOWER ARTERIES: ICD-10-PCS | Performed by: INTERNAL MEDICINE

## 2017-03-27 PROCEDURE — B2111ZZ FLUOROSCOPY OF MULTIPLE CORONARY ARTERIES USING LOW OSMOLAR CONTRAST: ICD-10-PCS | Performed by: INTERNAL MEDICINE

## 2017-03-27 PROCEDURE — B41BZZZ FLUOROSCOPY OF OTHER INTRA-ABDOMINAL ARTERIES: ICD-10-PCS | Performed by: INTERNAL MEDICINE

## 2017-03-27 PROCEDURE — 85025 COMPLETE CBC W/AUTO DIFF WBC: CPT | Performed by: NURSE PRACTITIONER

## 2017-03-27 PROCEDURE — B2181ZZ FLUOROSCOPY OF LEFT INTERNAL MAMMARY BYPASS GRAFT USING LOW OSMOLAR CONTRAST: ICD-10-PCS | Performed by: INTERNAL MEDICINE

## 2017-03-27 PROCEDURE — 25010000002 HYDRALAZINE PER 20 MG: Performed by: INTERNAL MEDICINE

## 2017-03-27 PROCEDURE — B2131ZZ FLUOROSCOPY OF MULTIPLE CORONARY ARTERY BYPASS GRAFTS USING LOW OSMOLAR CONTRAST: ICD-10-PCS | Performed by: INTERNAL MEDICINE

## 2017-03-27 PROCEDURE — 4A023N7 MEASUREMENT OF CARDIAC SAMPLING AND PRESSURE, LEFT HEART, PERCUTANEOUS APPROACH: ICD-10-PCS | Performed by: INTERNAL MEDICINE

## 2017-03-27 PROCEDURE — 63710000001 PREDNISONE PER 5 MG: Performed by: INTERNAL MEDICINE

## 2017-03-27 PROCEDURE — C1760 CLOSURE DEV, VASC: HCPCS | Performed by: INTERNAL MEDICINE

## 2017-03-27 RX ORDER — SODIUM CHLORIDE 9 MG/ML
100 INJECTION, SOLUTION INTRAVENOUS CONTINUOUS
Status: DISCONTINUED | OUTPATIENT
Start: 2017-03-27 | End: 2017-03-27 | Stop reason: HOSPADM

## 2017-03-27 RX ORDER — NITROGLYCERIN 0.4 MG/1
TABLET SUBLINGUAL AS NEEDED
Status: DISCONTINUED | OUTPATIENT
Start: 2017-03-27 | End: 2017-03-27 | Stop reason: HOSPADM

## 2017-03-27 RX ORDER — RANOLAZINE 500 MG/1
500 TABLET, EXTENDED RELEASE ORAL 2 TIMES DAILY
Qty: 60 TABLET | Refills: 0 | Status: SHIPPED | OUTPATIENT
Start: 2017-03-27

## 2017-03-27 RX ORDER — ISOSORBIDE MONONITRATE 30 MG/1
30 TABLET, EXTENDED RELEASE ORAL DAILY
Qty: 30 TABLET | Refills: 0 | Status: SHIPPED | OUTPATIENT
Start: 2017-03-27

## 2017-03-27 RX ORDER — NITROGLYCERIN 5 MG/ML
INJECTION, SOLUTION INTRAVENOUS AS NEEDED
Status: DISCONTINUED | OUTPATIENT
Start: 2017-03-27 | End: 2017-03-27 | Stop reason: HOSPADM

## 2017-03-27 RX ORDER — LOSARTAN POTASSIUM 25 MG/1
25 TABLET ORAL DAILY
Qty: 30 TABLET | Refills: 0 | Status: SHIPPED | OUTPATIENT
Start: 2017-03-27

## 2017-03-27 RX ORDER — HYDRALAZINE HYDROCHLORIDE 20 MG/ML
INJECTION INTRAMUSCULAR; INTRAVENOUS AS NEEDED
Status: DISCONTINUED | OUTPATIENT
Start: 2017-03-27 | End: 2017-03-27 | Stop reason: HOSPADM

## 2017-03-27 RX ORDER — MIDAZOLAM HYDROCHLORIDE 1 MG/ML
INJECTION INTRAMUSCULAR; INTRAVENOUS AS NEEDED
Status: DISCONTINUED | OUTPATIENT
Start: 2017-03-27 | End: 2017-03-27 | Stop reason: HOSPADM

## 2017-03-27 RX ORDER — LIDOCAINE HYDROCHLORIDE 20 MG/ML
INJECTION, SOLUTION INFILTRATION; PERINEURAL AS NEEDED
Status: DISCONTINUED | OUTPATIENT
Start: 2017-03-27 | End: 2017-03-27 | Stop reason: HOSPADM

## 2017-03-27 RX ORDER — DIPHENHYDRAMINE HYDROCHLORIDE 50 MG/ML
INJECTION INTRAMUSCULAR; INTRAVENOUS AS NEEDED
Status: DISCONTINUED | OUTPATIENT
Start: 2017-03-27 | End: 2017-03-27 | Stop reason: HOSPADM

## 2017-03-27 RX ADMIN — ASPIRIN 81 MG: 81 TABLET, COATED ORAL at 11:22

## 2017-03-27 RX ADMIN — HYDROCORTISONE SODIUM SUCCINATE 100 MG: 100 INJECTION, POWDER, FOR SOLUTION INTRAMUSCULAR; INTRAVENOUS at 09:11

## 2017-03-27 RX ADMIN — PREDNISONE 10 MG: 10 TABLET ORAL at 11:22

## 2017-03-27 RX ADMIN — SODIUM CHLORIDE 100 ML/HR: 9 INJECTION, SOLUTION INTRAVENOUS at 11:09

## 2017-03-27 RX ADMIN — METOPROLOL TARTRATE 25 MG: 25 TABLET ORAL at 11:22

## 2017-03-27 RX ADMIN — POTASSIUM CITRATE 10 MEQ: 10 TABLET ORAL at 11:22

## 2017-03-27 NOTE — PLAN OF CARE
Problem: Arrhythmia/Dysrhythmia (Symptomatic) (Adult)  Goal: Signs and Symptoms of Listed Potential Problems Will be Absent or Manageable (Arrhythmia/Dysrhythmia)  Outcome: Outcome(s) achieved Date Met:  03/27/17 03/27/17 0523   Arrhythmia/Dysrhythmia (Symptomatic)   Problems Assessed (Arrhythmia/Dysrhythmia) all   Problems Present (Arrhythmia/Dysrhythmia) none         Problem: Patient Care Overview (Adult)  Goal: Plan of Care Review  Outcome: Ongoing (interventions implemented as appropriate)    03/24/17 0532 03/26/17 0000 03/27/17 0523   Coping/Psychosocial Response Interventions   Plan Of Care Reviewed With --  patient --    Patient Care Overview   Progress no change --  --    Outcome Evaluation   Outcome Summary/Follow up Plan --  --  possible hc today

## 2017-03-27 NOTE — DISCHARGE SUMMARY
[facility] Medicine Services  DISCHARGE SUMMARY       Date of Admission: 3/24/2017  Date of Discharge:  3/27/2017  Primary Care Physician: Abdoul Castro MD    Presenting Problem/History of Present Illness:  NSTEMI (non-ST elevated myocardial infarction) [I21.4]  NSTEMI (non-ST elevated myocardial infarction) [I21.4]  Coronary artery disease involving native coronary artery of native heart, angina presence unspecified [I25.10]       Final Discharge Diagnoses:  Principal Problem:    NSTEMI (non-ST elevated myocardial infarction)  Active Problems:    CAD (coronary artery disease)    COPD (chronic obstructive pulmonary disease)    HTN (hypertension)    PAF (paroxysmal atrial fibrillation)    URI (upper respiratory infection)      Consults:   Consults     Date and Time Order Name Status Description    3/24/2017 0357 Inpatient Consult to Cardiology Completed           Procedures Performed: Procedure(s):  Left Heart Cath                Pertinent Test Results:   1. 100% occluded native right coronary artery and an occluded saphenous vein graft to the right coronary artery.  2. 100% occlusion of the left mid left anterior descending artery with a patent LIMA to the LAD.  3. Occluded saphenous vein graft to the obtuse marginal branch and to the diagonal branch.  4. First obtuse marginal branch with 30-40% stenosis.  5. 30-40% stenosis in the first diagonal branch.  6. Preserved left ventricular systolic function with an ejection fraction of 55%.      Recommendations medical management for the coronary artery disease and aggressive risk factor modification.      HPI/Hospital Course:  The patient is a 77 y.o. female with a significant medical history of CAD s/p CABG.  She was transferred from [facility] with a complaint of chest pain radiating to both shoulders with positive cardiac markers.  She was admitted and treated with antiplatelet, betablocker, and statin.  She was  "evaluated by cardiology who recommended heart cath.  Heart cath revealed occlusions of RCA, SVG to RCA, and LAD.  LIMA to LAD was patent.  She was recommended medical management and risk factor modification.  She will be treated with ASA, Lopressor, Cozaar, Imdur, Ranexa, and Lipitor.  She is stable and will be discharged to home with home health.     Condition on Discharge:  Stable    Physical Exam on Discharge:  BP 91/46 (BP Location: Right arm, Patient Position: Lying)  Pulse 63  Temp 96.5 °F (35.8 °C) (Axillary)  Comment: post heart cath vital signs   Resp 18  Ht 54\" (137.2 cm)  Wt 147 lb (66.7 kg)  LMP  (LMP Unknown)  SpO2 96%  Breastfeeding? No  BMI 35.44 kg/m2  Physical Exam   Constitutional: She is oriented to person, place, and time. She appears well-developed and well-nourished.   HENT:   Head: Normocephalic and atraumatic.   Eyes: Conjunctivae and EOM are normal.   Neck: Normal range of motion. Neck supple.   Cardiovascular: Normal rate, regular rhythm, normal heart sounds and intact distal pulses.  Exam reveals no gallop and no friction rub.    No murmur heard.  Pulmonary/Chest: Effort normal and breath sounds normal. No respiratory distress. She has no wheezes. She has no rales.   Abdominal: Soft. Bowel sounds are normal. She exhibits no distension. There is no tenderness.   Musculoskeletal: Normal range of motion. She exhibits no edema.   Neurological: She is alert and oriented to person, place, and time.   Skin: Skin is warm and dry. No erythema.     Discharge Disposition:  Home or Self Care    Discharge Medications:   Gabrielle Cherry   Home Medication Instructions TERA:[id number]    Printed on:03/27/17 4885   Medication Information                      albuterol (PROVENTIL HFA;VENTOLIN HFA) 108 (90 BASE) MCG/ACT inhaler  Inhale 2 puffs Every 4 (Four) Hours As Needed for Wheezing.             aspirin 81 MG EC tablet  Take 81 mg by mouth Daily.             atorvastatin (LIPITOR) 20 MG " "tablet  Take 20 mg by mouth Every Night. Take 2 tablets every night for cholesterol             budesonide-formoterol (SYMBICORT) 160-4.5 MCG/ACT inhaler  Inhale 2 puffs 2 (Two) Times a Day.             isosorbide mononitrate (IMDUR) 30 MG 24 hr tablet  Take 1 tablet by mouth Daily.             losartan (COZAAR) 25 MG tablet  Take 1 tablet by mouth Daily.             metoprolol tartrate (LOPRESSOR) 25 MG tablet  Take 1 tablet by mouth Every 12 (Twelve) Hours.             potassium citrate (UROCIT-K) 10 MEQ (1080 MG) CR tablet  Take 10 mEq by mouth Daily. States for kidney stones.  Ordered twice daily but states was only taken once daily             ranolazine (RANEXA) 500 MG 12 hr tablet  Take 1 tablet by mouth 2 (Two) Times a Day.             traZODone (DESYREL) 100 MG tablet  Take 100 mg by mouth Every Night. States for depression and sleep aide                 Discharge Diet:   Diet Instructions     Diet: Consistent Carbohydrate; Thin Liquids, No Restrictions       Discharge Diet:  Consistent Carbohydrate   Fluid Consistency:  Thin Liquids, No Restrictions                 Activity at Discharge:   Activity Instructions     Activity as Tolerated                     Follow-up Appointments:   1. PCP 1 week    Test Results Pending at Discharge:     MANUEL Warren  03/27/17  2:40 PM    Time: Discharge planning and teaching took greater than 30 minutes.            I personally evaluated and examined the patient in conjunction with MANUEL Carbajal and agree with the assessment, treatment plan, and disposition of the patient as recorded.    BP 91/46 (BP Location: Right arm, Patient Position: Lying)  Pulse 63  Temp 96.5 °F (35.8 °C) (Axillary)  Comment: post heart cath vital signs   Resp 18  Ht 54\" (137.2 cm)  Wt 147 lb (66.7 kg)  LMP  (LMP Unknown)  SpO2 96%  Breastfeeding? No  BMI 35.44 kg/m2    Physical Exam   Constitutional: She is oriented to person, place, and time. She appears " well-developed and well-nourished.   HENT:   Head: Normocephalic and atraumatic.   Eyes: Conjunctivae and EOM are normal.   Neck: Normal range of motion. Neck supple.   Cardiovascular: Normal rate, regular rhythm, normal heart sounds and intact distal pulses.  Exam reveals no gallop and no friction rub.    No murmur heard.  Pulmonary/Chest: Effort normal and breath sounds normal. No respiratory distress. She has no wheezes. She has no rales.   Abdominal: Soft. Bowel sounds are normal. She exhibits no distension. There is no tenderness.   Musculoskeletal: Normal range of motion. She exhibits no edema.   Neurological: She is alert and oriented to person, place, and time.   Skin: Skin is warm and dry. No erythema.     Abbie Dennison MD

## 2018-10-31 ENCOUNTER — CLINICAL SUPPORT (OUTPATIENT)
Dept: AUDIOLOGY | Facility: CLINIC | Age: 78
End: 2018-10-31

## 2018-10-31 ENCOUNTER — OFFICE VISIT (OUTPATIENT)
Dept: OTOLARYNGOLOGY | Facility: CLINIC | Age: 78
End: 2018-10-31

## 2018-10-31 VITALS — HEIGHT: 55 IN | BODY MASS INDEX: 34.02 KG/M2 | TEMPERATURE: 97.6 F | WEIGHT: 147 LBS

## 2018-10-31 DIAGNOSIS — R42 DIZZINESS AND GIDDINESS: ICD-10-CM

## 2018-10-31 DIAGNOSIS — H90.3 SENSORINEURAL HEARING LOSS, BILATERAL: ICD-10-CM

## 2018-10-31 DIAGNOSIS — H61.21 IMPACTED CERUMEN OF RIGHT EAR: Primary | ICD-10-CM

## 2018-10-31 DIAGNOSIS — H81.11 BPPV (BENIGN PAROXYSMAL POSITIONAL VERTIGO), RIGHT: ICD-10-CM

## 2018-10-31 DIAGNOSIS — H90.3 SENSORINEURAL HEARING LOSS, BILATERAL: Primary | ICD-10-CM

## 2018-10-31 PROCEDURE — 69210 REMOVE IMPACTED EAR WAX UNI: CPT | Performed by: OTOLARYNGOLOGY

## 2018-10-31 PROCEDURE — 99203 OFFICE O/P NEW LOW 30 MIN: CPT | Performed by: OTOLARYNGOLOGY

## 2018-10-31 RX ORDER — LORATADINE 10 MG/1
CAPSULE, LIQUID FILLED ORAL DAILY
COMMUNITY

## 2018-10-31 RX ORDER — CHLORAL HYDRATE 500 MG
1250 CAPSULE ORAL DAILY
COMMUNITY

## 2018-10-31 RX ORDER — LOPERAMIDE HYDROCHLORIDE 2 MG/1
2 CAPSULE ORAL 4 TIMES DAILY PRN
COMMUNITY
End: 2019-01-01 | Stop reason: HOSPADM

## 2018-10-31 RX ORDER — DOCUSATE SODIUM 250 MG
200 CAPSULE ORAL DAILY
COMMUNITY

## 2018-10-31 RX ORDER — ASPIRIN 325 MG
TABLET ORAL
COMMUNITY

## 2018-10-31 NOTE — PATIENT INSTRUCTIONS

## 2018-10-31 NOTE — PROGRESS NOTES
STANDARD AUDIOMETRIC EVALUATION      Name:  Abby Cherry  :  1940  Age:  78 y.o.  Date of Evaluation:  10/31/2018      HISTORY    Reason for visit:  Abby Cherry is seen today for a hearing evaluation at the request of Dr. Danny Bronson.  Patient reports she is dizzy all the time, and feel spinning when she lays down at night.  She states she has fallen recently, possibly due to dizziness.  She states she hears ringing in her ears, and her right ear feels full.  She states she wears hearing aids only when driving.      EVALUATION    See Audiogram    RESULTS        Otoscopy and Tympanometry 226 Hz :  Right Ear:  Otoscopy:  Cerumen impaction, Testing completed after ears were cleaned          Tympanometry:  Middle ear function within normal limits    Left Ear:   Otoscopy:  Clear ear canal        Tympanometry:  Middle ear function within normal limits    Test technique:  Standard Audiometry     Pure Tone Audiometry:   Patient responded to pure tones at 30-85 dB for 250-8000 Hz in right ear, and at 30-75 dB for 250-8000 Hz in left ear.       Speech Audiometry:        Right Ear:  Speech Reception Threshold (SRT) was obtained at 35 dBHL                 Speech Discrimination scores were 52% in quiet when words were presented at 75 dBHL       Left Ear:  Speech Reception Threshold (SRT) was obtained at 25 dBHL                 Speech Discrimination scores were 52% in quiet when words were presented at 65 dBHL    Reliability:   good    IMPRESSIONS:  1.  Tympanometry results are consistent with Middle ear function within normal limits in both ears.  2.  Pure tone results are consistent with mild to severe sloping sensorineural hearing loss  for both ears.       RECOMMENDATIONS:  Patient is seeing the Ear Nose and Throat physician immediately following this examination.  It was a pleasure seeing Abby Cherry in Audiology today.  We would be happy to do further testing or discuss these test as necessary.          This  document has been electronically signed by Do Mims MS CCC-A on October 31, 2018 4:28 PM       Do Mims MS CCC-A  Licensed Audiologist

## 2018-10-31 NOTE — PROGRESS NOTES
Subjective   Abby Cherry is a 78 y.o. female.    Dizziness  History of Present Illness   She has a history of dizziness she says spinning patient which she tries again out of bed she is multiple other problems or heart lungs has had atrial right problems.  She denies ear infection she's had cerumen removed.  Her past denies drainage or ear does have hearing loss  Relates that she has used hearing aids because of hearing loss we don't have access to her records..  Multiple other medical problems including lung problems and heart irregularities not have any recent ear surgery ear pain or drainage says her hearing is relatively stable    The following portions of the patient's history were reviewed and updated as appropriate: allergies, current medications, past family history, past medical history, past social history, past surgical history and problem list.      Abby Cherry reports that she quit smoking about 38 years ago. Her smoking use included Cigarettes. She has a 3.75 pack-year smoking history. She does not have any smokeless tobacco history on file. She reports that she does not drink alcohol or use drugs.  Patient is not a tobacco user and has been counseled for use of tobacco products    Family History   Problem Relation Age of Onset   • Heart disease Mother    • Diabetes Mother    • Diabetes Father    • Heart disease Father          Current Outpatient Prescriptions:   •  aspirin 325 MG tablet, aspirin 325 mg tablet, Disp: , Rfl:   •  atorvastatin (LIPITOR) 20 MG tablet, Take 20 mg by mouth Every Night. Take 2 tablets every night for cholesterol, Disp: , Rfl:   •  Calcium Carb-Cholecalciferol (CALCIUM 600 + D) 600-200 MG-UNIT tablet, Take  by mouth., Disp: , Rfl:   •  docusate sodium (COLACE) 250 MG capsule, Take 200 mg by mouth Daily., Disp: , Rfl:   •  isosorbide mononitrate (IMDUR) 30 MG 24 hr tablet, Take 1 tablet by mouth Daily., Disp: 30 tablet, Rfl: 0  •  loperamide (IMODIUM) 2 MG capsule, Take 2  mg by mouth 4 (Four) Times a Day As Needed for Diarrhea., Disp: , Rfl:   •  Loratadine 10 MG capsule, Take  by mouth., Disp: , Rfl:   •  losartan (COZAAR) 25 MG tablet, Take 1 tablet by mouth Daily., Disp: 30 tablet, Rfl: 0  •  Omega-3 1000 MG capsule, Take 1,250 mg by mouth., Disp: , Rfl:   •  potassium citrate (UROCIT-K) 10 MEQ (1080 MG) CR tablet, Take 10 mEq by mouth Daily. States for kidney stones.  Ordered twice daily but states was only taken once daily, Disp: , Rfl:   •  ranolazine (RANEXA) 500 MG 12 hr tablet, Take 1 tablet by mouth 2 (Two) Times a Day., Disp: 60 tablet, Rfl: 0  •  traZODone (DESYREL) 100 MG tablet, Take 100 mg by mouth Every Night. States for depression and sleep aide, Disp: , Rfl:     Allergies   Allergen Reactions   • Codeine Palpitations and Rash   • Iodinated Diagnostic Agents Shortness Of Breath and Palpitations       Past Medical History:   Diagnosis Date   • Arthritis    • Asthma    • COPD (chronic obstructive pulmonary disease) (CMS/[facility])    • Coronary artery disease    • Hypertension    • Stroke (CMS/[facility])          Review of Systems   HENT: Positive for mouth sores and trouble swallowing.    Eyes: Positive for visual disturbance.   Respiratory: Positive for shortness of breath and wheezing.    Cardiovascular: Positive for chest pain and palpitations.   Musculoskeletal: Positive for back pain, neck pain and neck stiffness.        Arthritis   Skin: Positive for rash.   Neurological: Positive for dizziness, tremors, weakness and numbness.   Hematological: Bruises/bleeds easily.   All other systems reviewed and are negative.          Objective   Physical Exam   Constitutional: She appears well-developed.   HENT:   Head: Normocephalic.   Right Ear: External ear normal.   Left Ear: External ear normal.   Ears:    Nose: Nose normal.   Mouth/Throat: Oropharynx is clear and moist.   Eyes: Conjunctivae and EOM are normal.   Neck: Normal range of motion.   Pulmonary/Chest: Effort normal.    Musculoskeletal: Normal range of motion.   Neurological: She is alert.   Patient cannot tolerate much for neural workup that she had a fall and hurt her back she walks with a cane mainly is in a wheelchair here in the office she has no nystagmus and I tried to clean her back and she had significant pain because of her Janina could not do a [place]  Is a states she has multiple orthopedic problems including shoulder and joint problems   Skin: Skin is warm.   Psychiatric: She has a normal mood and affect. Thought content normal.    she has extensive right cerumen impaction blocking ear canal unable to get hearing testing until clear.  AudioGram was revealed with the patient tracing shown to her revealing sensorineural hearing loss     Procedure note cerumen impaction was cleaned with suction and forceps large amount of wax removed eardrum and ear canal were otherwise normal she tolerated it well without evidence complication    Assessment/Plan   Abby was seen today for dizziness.    Diagnoses and all orders for this visit:    Impacted cerumen of right ear    Sensorineural hearing loss, bilateral  -     Ambulatory Referral to Physical Therapy Vestibular    Dizziness and giddiness  -     Ambulatory Referral to Physical Therapy Vestibular    BPPV (benign paroxysmal positional vertigo), right  -     Ambulatory Referral to Physical Therapy Vestibular         discussed earcare to prevent wax buildup    to obtain hearing testing results we can compare  Stimulator therapy therapy to help prevent falls and treat her right BPPV check in 1 month unless problems  Not recommend meclizine or Valium situation she's to avoid dangers activity

## 2018-11-13 ENCOUNTER — APPOINTMENT (OUTPATIENT)
Dept: PHYSICAL THERAPY | Facility: HOSPITAL | Age: 78
End: 2018-11-13
Attending: OTOLARYNGOLOGY

## 2018-11-19 ENCOUNTER — HOSPITAL ENCOUNTER (OUTPATIENT)
Dept: PHYSICAL THERAPY | Facility: HOSPITAL | Age: 78
Setting detail: THERAPIES SERIES
Discharge: HOME | End: 2018-11-19
Attending: OTOLARYNGOLOGY

## 2018-11-19 DIAGNOSIS — H81.11 BPPV (BENIGN PAROXYSMAL POSITIONAL VERTIGO), RIGHT: Primary | ICD-10-CM

## 2018-11-19 DIAGNOSIS — R42 DIZZINESS AND GIDDINESS: ICD-10-CM

## 2018-11-19 PROCEDURE — G8981 BODY POS CURRENT STATUS: HCPCS | Performed by: PHYSICAL THERAPIST

## 2018-11-19 PROCEDURE — G8982 BODY POS GOAL STATUS: HCPCS | Performed by: PHYSICAL THERAPIST

## 2018-11-19 PROCEDURE — 97162 PT EVAL MOD COMPLEX 30 MIN: CPT | Performed by: PHYSICAL THERAPIST

## 2018-11-19 PROCEDURE — 97110 THERAPEUTIC EXERCISES: CPT | Performed by: PHYSICAL THERAPIST

## 2018-11-20 ENCOUNTER — OFFICE VISIT (OUTPATIENT)
Dept: FAMILY MEDICINE CLINIC | Facility: CLINIC | Age: 78
End: 2018-11-20

## 2018-11-20 VITALS
HEIGHT: 55 IN | OXYGEN SATURATION: 97 % | HEART RATE: 65 BPM | DIASTOLIC BLOOD PRESSURE: 60 MMHG | SYSTOLIC BLOOD PRESSURE: 128 MMHG | TEMPERATURE: 97.9 F | WEIGHT: 155.9 LBS | BODY MASS INDEX: 36.08 KG/M2

## 2018-11-20 DIAGNOSIS — H93.91 EAR PROBLEM, RIGHT: Primary | ICD-10-CM

## 2018-11-20 PROCEDURE — 99212 OFFICE O/P EST SF 10 MIN: CPT | Performed by: FAMILY MEDICINE

## 2018-11-20 PROCEDURE — 69200 CLEAR OUTER EAR CANAL: CPT | Performed by: FAMILY MEDICINE

## 2018-11-20 RX ORDER — FLUTICASONE PROPIONATE 50 MCG
2 SPRAY, SUSPENSION (ML) NASAL DAILY
Qty: 1 BOTTLE | Refills: 2 | Status: SHIPPED | OUTPATIENT
Start: 2018-11-20 | End: 2018-12-05

## 2018-11-20 NOTE — PROGRESS NOTES
Subjective  Pt of Dr. Gloria Cherry is a 78 y.o. white female. H/O HTN , [place].  Pt present for evaluation of acute health problems.    ' Had ears cleaned by Dr. Bronson yesterday. Went to hearing specialist to have ears checked for hearing test, said to come in and have ears cleaned, R ear full of wax, ear feels funny'.    Earache    There is pain in the right ear. This is a new problem. The current episode started today. The problem occurs constantly. The problem has been waxing and waning. There has been no fever. The pain is at a severity of 1/10. The pain is mild. Treatments tried: Ears cleaned. The treatment provided no relief.        CAD, Diabetes  boths side   The following portions of the patient's history were reviewed and updated as appropriate: allergies, current medications, past family history, past medical history, past social history, past surgical history and problem list.    Review of Systems   Constitutional: Negative for chills and fever.   HENT: Positive for ear pain.    Eyes: Negative.    Respiratory: Negative.    Cardiovascular: Negative.    Gastrointestinal: Negative.    Endocrine: Negative.    Genitourinary: Negative.    Musculoskeletal: Negative.    Allergic/Immunologic: Negative.    Neurological: Negative.    Hematological: Negative.    Psychiatric/Behavioral: Negative.        Objective   Physical Exam   Constitutional: She is oriented to person, place, and time.   HENT:   Head: Normocephalic.   Left Ear: External ear normal.   Mouth/Throat: Oropharynx is clear and moist.   R ear occluded by probable insect.   Eyes: Pupils are equal, round, and reactive to light.   Neck: Normal range of motion.   Cardiovascular: Normal rate.   Pulmonary/Chest: Effort normal and breath sounds normal.   Abdominal: Soft.   Musculoskeletal: Normal range of motion.   Neurological: She is alert and oriented to person, place, and time.   Skin: Skin is warm and dry.   Psychiatric: She has a normal mood and  affect. Her behavior is normal. Thought content normal.   Nursing note and vitals reviewed.      Assessment/Plan   Abby was seen today for ear problem.    Diagnoses and all orders for this visit:    Ear problem, right  Comments:  Foreign body, suspect insect    Other orders  -     fluticasone (FLONASE) 50 MCG/ACT nasal spray; 2 sprays into the nostril(s) as directed by provider Daily.      Discussed exam, R ear occlusion , removal of foreign body, insect, wax, or there. Pt desires to have procedure.          This document has been electronically signed by Tayo Aragon MD

## 2018-11-20 NOTE — THERAPY EVALUATION
Outpatient Physical Therapy Ortho Initial Evaluation  [facility]     Patient Name: Abby Cherry  : 1940  MRN: [id number]  Today's Date: 2018      Visit Date: 2018  Visit   Return to MD: JUDY  Re-cert date: 12/10/18  Patient Active Problem List   Diagnosis   • NSTEMI (non-ST elevated myocardial infarction) (CMS/HCC)   • CAD (coronary artery disease)   • COPD (chronic obstructive pulmonary disease) (CMS/HCC)   • HTN (hypertension)   • PAF (paroxysmal atrial fibrillation) (CMS/HCC)   • URI (upper respiratory infection)        Past Medical History:   Diagnosis Date   • Arthritis    • Asthma    • COPD (chronic obstructive pulmonary disease) (CMS/HCC)    • Coronary artery disease    • Hypertension    • NSTEMI (non-ST elevated myocardial infarction) (CMS/HCC)    • Stroke (CMS/HCC)         Past Surgical History:   Procedure Laterality Date   • CARDIAC SURGERY     • HIP ARTHROPLASTY Right    • JOINT REPLACEMENT         Visit Dx:     ICD-10-CM ICD-9-CM   1. BPPV (benign paroxysmal positional vertigo), right H81.11 386.11   2. Dizziness and giddiness R42 780.4     Medications (Admitted on 2018)     aspirin 325 MG tablet     atorvastatin (LIPITOR) 20 MG tablet     Calcium Carb-Cholecalciferol (CALCIUM 600 + D) 600-200 MG-UNIT tablet     docusate sodium (COLACE) 250 MG capsule     isosorbide mononitrate (IMDUR) 30 MG 24 hr tablet     loperamide (IMODIUM) 2 MG capsule     Loratadine 10 MG capsule     losartan (COZAAR) 25 MG tablet     Omega-3 1000 MG capsule     potassium citrate (UROCIT-K) 10 MEQ (1080 MG) CR tablet     ranolazine (RANEXA) 500 MG 12 hr tablet     traZODone (DESYREL) 100 MG tablet        Allergies: Codeine, Iodinated Diagnostic Agents    PT Ortho     Row Name 18 1600       Subjective Comments    Subjective Comments  77 yo female with 2 yr h/o gradual onset vertigo, had a fall 2 years ago and the dizziness became worse over time. Reports a spinning  sensation (vertigo) of the room whenever she transitions from sitting to standing, laying down and walking. In addition, has worsening verigo rolling in bed either way. Symptoms worst with head turns to the right > left.  -BS       Precautions and Contraindications    Precautions/Limitations  fall precautions  -BS    Precautions  vertigo  -BS       Subjective Pain    Able to rate subjective pain?  yes  -BS    Pre-Treatment Pain Level  8  -BS    Post-Treatment Pain Level  8  -BS    Subjective Pain Comment  pt c/o LBP > right shoulder/right knee pain  -BS       Posture/Observations    Posture/Observations Comments  eye tracking (head fixed in space): vertigo elicited in all directions: R<>L, looking up/down, diagonal patterns, vertigo elicited going R>L rolling on mat table, vertigo provoked going supine to sit and sit to supine w/ symptoms resolving in < 5 sec with all directional movement.  -BS       General ROM    GENERAL ROM COMMENTS  limited cervical spine AROM: mod limit 50%-all planes, R knee flex ~90° AROM, B shoulder flex ~90° AROM  -BS       MMT (Manual Muscle Testing)    General MMT Comments  grossly 3+/5 bilat LE's  -BS       Sensation    Additional Comments  pt unable to assume laying in supine position due to severe orthopaedic pain in B shoulders, low back and R knee regions  -BS      User Key  (r) = Recorded By, (t) = Taken By, (c) = Cosigned By    Initials Name Provider Type    Jaime Ramírez, PT Physical Therapist                      Therapy Education  Education Details: Segundo Leigh ex  Given: HEP  Program: New  How Provided: Verbal, Written  Provided to: Patient  Level of Understanding: Verbalized, Demonstrated     PT OP Goals     Row Name 11/19/18 1600          PT Short Term Goals    STG Date to Achieve  12/10/18  -BS     STG 1  Patient indep with Segundo Leigh exercise   -BS     STG 1 Progress  New  -BS     STG 2  Reduce vertigo symptoms by 75% with supine<>sit, head turns to the right  -BS      STG 2 Progress  New  -BS     STG 3  Reduce vertigo symptoms by 75% with sit <> stand transfers  -BS     STG 3 Progress  New  -BS     STG 4  Patient indep with performing vertigo habituation exercises  -BS     STG 4 Progress  New  -BS        Time Calculation    PT Goal Re-Cert Due Date  12/10/18  -BS       User Key  (r) = Recorded By, (t) = Taken By, (c) = Cosigned By    Initials Name Provider Type    BS Jaime Tran, PT Physical Therapist          PT Assessment/Plan     Row Name 11/19/18 1700          PT Assessment    Functional Limitations  Impaired gait;Performance in work activities;Performance in sport activities;Performance in self-care ADL;Performance in leisure activities;Limitation in home management  -BS     Impairments  Balance;Gait;Impaired flexibility;Range of motion;Locomotion  -BS     Assessment Comments  Vertigo elicited with head turns right>left due to BPPV.  -BS     Please refer to paper survey for additional self-reported information  Yes  -BS     Rehab Potential  Fair  -BS     Patient/caregiver participated in establishment of treatment plan and goals  Yes  -BS     Patient would benefit from skilled therapy intervention  Yes  -BS        PT Plan    PT Frequency  1x/week  -BS     Predicted Duration of Therapy Intervention (Therapy Eval)  4 weeks  -BS     Planned CPT's?  PT EVAL MOD COMPLELITY: 15178;PT RE-EVAL: 79646;PT THER PROC EA 15 MIN: 57543;PT THER ACT EA 15 MIN: 50886  -BS     Physical Therapy Interventions (Optional Details)  home exercise program;transfer training;patient/family education vertigo habituation exercises  -BS     PT Plan Comments  review Segundo Leigh ex, have pt complete Dizziness Handicap Inventory.  -BS       User Key  (r) = Recorded By, (t) = Taken By, (c) = Cosigned By    Initials Name Provider Type    Jaime Ramírez, PT Physical Therapist            Exercises     Row Name 11/19/18 1600             Subjective Comments    Subjective Comments  77 yo female with 2  yr h/o gradual onset vertigo, had a fall 2 years ago and the dizziness became worse over time. Reports a spinning sensation (vertigo) of the room whenever she transitions from sitting to standing, laying down and walking. In addition, has worsening verigo rolling in bed either way. Symptoms worst with head turns to the right > left.  -BS         Subjective Pain    Able to rate subjective pain?  yes  -BS      Pre-Treatment Pain Level  8  -BS      Post-Treatment Pain Level  8  -BS      Subjective Pain Comment  pt c/o LBP > right shoulder/right knee pain  -BS         Exercise 1    Exercise Name 1  Modified Segundo Leigh habituation exercise  -BS      Reps 1  1 rep  -BS      Time 1  8 minutes  -BS        User Key  (r) = Recorded By, (t) = Taken By, (c) = Cosigned By    Initials Name Provider Type    Jaime Ramírez, PT Physical Therapist                        Outcome Measure Options: Dizziness Handicap Inventory  Dizziness Handicap Inventory  Dizziness Handicap Inventory: Severe Perception of having a handicap  Dizziness Handicap Inventory Comments: ~80% limited      Time Calculation:     Therapy Suggested Charges     Code   Minutes Charges    None             Start Time: 1644  Stop Time: 1740  Time Calculation (min): 56 min  Total Timed Code Minutes- PT: 56 minute(s)     Therapy Charges for Today     Code Description Service Date Service Provider Modifiers Qty    35995202245  PT CHNG MAIN POS CURRENT 11/19/2018 Jaime Tran, PT GP, CM 1    [id number]  PT CHNG MAIN POS PROJECTED 11/19/2018 Jaime Tran, PT GP, CJ 1    [id number]  PT EVAL MOD COMPLEXITY 3 11/19/2018 Jaime Tran, PT GP 1    [id number]  PT THER PROC EA 15 MIN 11/19/2018 Jaime Tran, PT GP 1          PT G-Codes  PT Professional Judgement Used?: Yes  Outcome Measure Options: Dizziness Handicap Inventory  Functional Limitation: Changing and maintaining body position  Changing and Maintaining Body Position Current Status (): At  least 80 percent but less than 100 percent impaired, limited or restricted  Changing and Maintaining Body Position Goal Status (): At least 20 percent but less than 40 percent impaired, limited or restricted         Jaime Tran, PT  11/19/2018

## 2018-11-26 NOTE — PROGRESS NOTES
Procedure   Foreign Body Removal  Date/Time: 11/20/2018 10:40 AM  Performed by: Tayo Aragon MD  Authorized by: Tayo Aragon MD   Consent: Written consent obtained.  Risks and benefits: risks, benefits and alternatives were discussed  Consent given by: patient  Patient understanding: patient states understanding of the procedure being performed  Body area: ear  Location details: right ear  Localization method: visualized  Removal mechanism: irrigation and forceps  1 objects recovered.  Objects recovered: Bed Bug  Patient tolerance: Patient tolerated the procedure well with no immediate complications  Comments:  R ear was irrigated 4 times, inspected , Insect was gradually dislodge to outer ear canal. Removed with forceps, object was Live Bed Bug. Discussed inspection of Home for infestation. Discussed F/U with PCP. R ear canal TMI, Discussed ETD, trial of Flonase.

## 2018-11-27 ENCOUNTER — HOSPITAL ENCOUNTER (OUTPATIENT)
Dept: PHYSICAL THERAPY | Facility: HOSPITAL | Age: 78
Setting detail: THERAPIES SERIES
Discharge: HOME | End: 2018-11-27

## 2018-11-27 ENCOUNTER — TELEPHONE (OUTPATIENT)
Dept: PHYSICAL THERAPY | Facility: HOSPITAL | Age: 78
End: 2018-11-27

## 2018-11-27 DIAGNOSIS — H81.11 BPPV (BENIGN PAROXYSMAL POSITIONAL VERTIGO), RIGHT: ICD-10-CM

## 2018-11-27 DIAGNOSIS — R42 DIZZINESS AND GIDDINESS: Primary | ICD-10-CM

## 2018-11-27 PROCEDURE — 97110 THERAPEUTIC EXERCISES: CPT | Performed by: PHYSICAL THERAPIST

## 2018-11-27 NOTE — TELEPHONE ENCOUNTER
Patient answers the phone and states she over slept for her appointment. She does not wish to reschedule and states that it takes her awhile ot get up and going and that she will call us later to schedule.

## 2018-11-27 NOTE — THERAPY TREATMENT NOTE
Outpatient Physical Therapy Ortho Treatment Note  [facility]     Patient Name: Abby Cherry  : 1940  MRN: [id number]  Today's Date: 2018      Visit Date: 2018  Visit 2/  Return to MD: JUDY  Re-cert date: 12/10/18  % improvement: 0    Visit Dx:    ICD-10-CM ICD-9-CM   1. Dizziness and giddiness R42 780.4   2. BPPV (benign paroxysmal positional vertigo), right H81.11 386.11       Patient Active Problem List   Diagnosis   • NSTEMI (non-ST elevated myocardial infarction) (CMS/[facility])   • CAD (coronary artery disease)   • COPD (chronic obstructive pulmonary disease) (CMS/[facility])   • HTN (hypertension)   • PAF (paroxysmal atrial fibrillation) (CMS/[facility])   • URI (upper respiratory infection)        Past Medical History:   Diagnosis Date   • Arthritis    • Asthma    • COPD (chronic obstructive pulmonary disease) (CMS/[facility])    • Coronary artery disease    • Hypertension    • NSTEMI (non-ST elevated myocardial infarction) (CMS/[facility])    • Stroke (CMS/[facility])         Past Surgical History:   Procedure Laterality Date   • CARDIAC SURGERY     • HIP ARTHROPLASTY Right    • JOINT REPLACEMENT         PT Ortho     Row Name 18 1400       Precautions and Contraindications    Precautions/Limitations  fall precautions  -BS    Precautions  vertigo  -BS       Subjective Pain    Able to rate subjective pain?  yes  -BS    Pre-Treatment Pain Level  4  -BS    Post-Treatment Pain Level  4  -BS      User Key  (r) = Recorded By, (t) = Taken By, (c) = Cosigned By    Initials Name Provider Type    Jaime Ramírez, PT Physical Therapist                      PT Assessment/Plan     Row Name 18 1700          PT Assessment    Assessment Comments  no reduction in BPPV in part due to pt non-compliance (did not do HEP at all over the past week). Instructed pt to improve compliance with HEP and issued handout to reinforce compliance with BPPV habituation ex's.  -BS        PT Plan    PT Frequency  1x/week  -BS      PT Plan Comments  recheck tolerance/compliance to HEP  -BS       User Key  (r) = Recorded By, (t) = Taken By, (c) = Cosigned By    Initials Name Provider Type    Jaime Ramríez, PT Physical Therapist              Exercises     Row Name 11/27/18 1400             Subjective Comments    Subjective Comments  pt reports did not do HEP issued at HEP  -BS         Subjective Pain    Able to rate subjective pain?  yes  -BS      Pre-Treatment Pain Level  4  -BS      Post-Treatment Pain Level  4  -BS         Exercise 1    Exercise Name 1  Modified Raymond Daroff habituation exercise  -BS      Reps 1  1 rep  -BS      Time 1  R<>L  -BS         Exercise 2    Exercise Name 2  eye tracking: L<>R, up/down, diagonal patterns  -BS      Reps 2  5 ea  -BS         Exercise 3    Exercise Name 3  head/neck turns: R<>L, flex/ext  -BS      Reps 3  5 reps  -BS         Exercise 4    Exercise Name 4  seated: bending over to touch toes  -BS      Reps 4  5 reps  -BS        User Key  (r) = Recorded By, (t) = Taken By, (c) = Cosigned By    Initials Name Provider Type    Jaime Ramírez, PT Physical Therapist                         PT OP Goals     Row Name 11/27/18 1500 11/27/18 1432       PT Short Term Goals    STG Date to Achieve  --  12/10/18  -BS    STG 1  --  Patient indep with Raymond Daroff exercise   -BS    STG 1 Progress  --  Ongoing  -BS    STG 2  --  Reduce vertigo symptoms by 75% with supine<>sit, head turns to the right  -BS    STG 2 Progress  --  Ongoing  -BS    STG 3  --  Reduce vertigo symptoms by 75% with sit <> stand transfers  -BS    STG 3 Progress  --  Ongoing  -BS    STG 4  --  Patient indep with performing vertigo habituation exercises  -BS    STG 4 Progress  --  Ongoing  -BS       Time Calculation    PT Goal Re-Cert Due Date  --  -BS  12/10/18  -BS      User Key  (r) = Recorded By, (t) = Taken By, (c) = Cosigned By    Initials Name Provider Type    Jaime Ramírez, PT Physical Therapist          Therapy  Education  Education Details: Segundo Leigh, seated eye tracking and head turns (vestibular habituation ex)  Given: HEP  Program: New, Reinforced  How Provided: Verbal, Demonstration, Written  Provided to: Patient  Level of Understanding: Verbalized, Demonstrated              Time Calculation:   Start Time: 1432  Stop Time: 1518  Time Calculation (min): 46 min  Total Timed Code Minutes- PT: 46 minute(s)  Therapy Suggested Charges     Code   Minutes Charges    None           Therapy Charges for Today     Code Description Service Date Service Provider Modifiers Qty    [id number] HC PT THER PROC EA 15 MIN 11/27/2018 Jaime Tran, PT GP 3                    Jaime Tran, PT  11/27/2018

## 2018-12-04 ENCOUNTER — HOSPITAL ENCOUNTER (OUTPATIENT)
Dept: PHYSICAL THERAPY | Facility: HOSPITAL | Age: 78
Setting detail: THERAPIES SERIES
Discharge: HOME | End: 2018-12-04

## 2018-12-04 DIAGNOSIS — R42 DIZZINESS AND GIDDINESS: Primary | ICD-10-CM

## 2018-12-04 DIAGNOSIS — H81.11 BPPV (BENIGN PAROXYSMAL POSITIONAL VERTIGO), RIGHT: ICD-10-CM

## 2018-12-04 PROCEDURE — 97110 THERAPEUTIC EXERCISES: CPT | Performed by: PHYSICAL THERAPIST

## 2018-12-05 ENCOUNTER — OFFICE VISIT (OUTPATIENT)
Dept: OTOLARYNGOLOGY | Facility: CLINIC | Age: 78
End: 2018-12-05

## 2018-12-05 VITALS — HEIGHT: 55 IN | WEIGHT: 155 LBS | TEMPERATURE: 97.3 F | BODY MASS INDEX: 35.87 KG/M2

## 2018-12-05 DIAGNOSIS — H90.3 SENSORINEURAL HEARING LOSS, BILATERAL: ICD-10-CM

## 2018-12-05 DIAGNOSIS — R42 DIZZINESS AND GIDDINESS: Primary | ICD-10-CM

## 2018-12-05 PROCEDURE — 99213 OFFICE O/P EST LOW 20 MIN: CPT | Performed by: OTOLARYNGOLOGY

## 2018-12-05 NOTE — THERAPY TREATMENT NOTE
Outpatient Physical Therapy Ortho Treatment Note  [facility]     Patient Name: Abby Cherry  : 1940  MRN: [id number]  Today's Date: 2018      Visit Date: 2018  Visit 3/3  Return to MD: JUDY  Re-cert date: 12/10/18  % improvement: 0      Visit Dx:    ICD-10-CM ICD-9-CM   1. Dizziness and giddiness R42 780.4   2. BPPV (benign paroxysmal positional vertigo), right H81.11 386.11       Patient Active Problem List   Diagnosis   • NSTEMI (non-ST elevated myocardial infarction) (CMS/[facility])   • CAD (coronary artery disease)   • COPD (chronic obstructive pulmonary disease) (CMS/[facility])   • HTN (hypertension)   • PAF (paroxysmal atrial fibrillation) (CMS/[facility])   • URI (upper respiratory infection)        Past Medical History:   Diagnosis Date   • Arthritis    • Asthma    • COPD (chronic obstructive pulmonary disease) (CMS/[facility])    • Coronary artery disease    • Hypertension    • NSTEMI (non-ST elevated myocardial infarction) (CMS/[facility])    • Stroke (CMS/[facility])         Past Surgical History:   Procedure Laterality Date   • CARDIAC SURGERY     • HIP ARTHROPLASTY Right    • JOINT REPLACEMENT         PT Ortho     Row Name 18 1400       Precautions and Contraindications    Precautions/Limitations  fall precautions  -BS    Precautions  vertigo  -BS       Subjective Pain    Able to rate subjective pain?  yes  -BS    Pre-Treatment Pain Level  4  -BS    Post-Treatment Pain Level  4  -BS      User Key  (r) = Recorded By, (t) = Taken By, (c) = Cosigned By    Initials Name Provider Type    BS Jaime Tran, PT Physical Therapist                      PT Assessment/Plan     Row Name 18 1800          PT Assessment    Assessment Comments  no reduction in BPPV sxs post tx. Added Home Epley to HEP.  -BS        PT Plan    PT Frequency  1x/week  -BS     PT Plan Comments  recheck compliance and tolerance to HEP.   -BS       User Key  (r) = Recorded By, (t) = Taken By, (c) = Cosigned By    Initials Name  Provider Type    Jaime Ramírez, PT Physical Therapist              Exercises     Row Name 12/04/18 1400             Subjective Comments    Subjective Comments  pt reports no change in vertigo sxs, doing HEP more than 2-3x/day, dizziness unchanged in intensity, however, symptoms subside quicker than last week.    -BS         Subjective Pain    Able to rate subjective pain?  yes  -BS      Pre-Treatment Pain Level  4  -BS      Post-Treatment Pain Level  4  -BS         Exercise 1    Exercise Name 1  Home Epley maneuver  -BS      Reps 1  1 rep  -BS      Time 1  5 minutes  -BS         Exercise 2    Exercise Name 2  head turns: up/down, R<>L  -BS      Reps 2  10 ea  -BS         Exercise 3    Exercise Name 3  sit to stand  -BS      Reps 3  1 rep  -BS         Exercise 4    Exercise Name 4  sit<>supine  -BS      Reps 4  1 rep  -BS        User Key  (r) = Recorded By, (t) = Taken By, (c) = Cosigned By    Initials Name Provider Type    Jaime Ramírez, PT Physical Therapist                         PT OP Goals     Row Name 12/04/18 1400          PT Short Term Goals    STG Date to Achieve  12/10/18  -BS     STG 1  Patient indep with Segundo Leigh exercise   -BS     STG 1 Progress  Ongoing  -BS     STG 2  Reduce vertigo symptoms by 75% with supine<>sit, head turns to the right  -BS     STG 2 Progress  Ongoing  -BS     STG 3  Reduce vertigo symptoms by 75% with sit <> stand transfers  -BS     STG 3 Progress  Ongoing  -BS     STG 4  Patient indep with performing vertigo habituation exercises  -BS     STG 4 Progress  Ongoing  -BS        Time Calculation    PT Goal Re-Cert Due Date  12/10/18  -BS       User Key  (r) = Recorded By, (t) = Taken By, (c) = Cosigned By    Initials Name Provider Type    Jaime Ramírez, PT Physical Therapist          Therapy Education  Education Details: Home Epley  Given: HEP  Program: New, Reinforced  How Provided: Verbal, Demonstration, Written  Provided to: Patient  Level of Understanding:  Verbalized, Demonstrated              Time Calculation:   Start Time: 1358  Stop Time: 1430  Time Calculation (min): 32 min  Total Timed Code Minutes- PT: 32 minute(s)  Therapy Suggested Charges     Code   Minutes Charges    None           Therapy Charges for Today     Code Description Service Date Service Provider Modifiers Qty    95404990121 HC PT THER PROC EA 15 MIN 12/4/2018 Jaime Tran, PT GP 2                    Jaime Tran, PT  12/4/2018

## 2018-12-05 NOTE — PATIENT INSTRUCTIONS

## 2018-12-06 RX ORDER — DIAZEPAM 2 MG/1
TABLET ORAL
Qty: 10 TABLET | Refills: 0 | Status: ON HOLD | OUTPATIENT
Start: 2018-12-06 | End: 2018-12-31

## 2018-12-06 NOTE — PROGRESS NOTES
Subjective   Abby Cherry is a 78 y.o. female.   For dizziness    History of Present Illness   Patient states she went to therapy which helped a little but not much she says she still has dizziness when she rolls over in bed.  Says she's doing the exercises.    She says she has a separate component when she gets up she feels dizzy and almost wants to follow this is different than when she is in bed.  Is not having any headaches or new changes or hearing.    The following portions of the patient's history were reviewed and updated as appropriate: allergies, current medications, past family history, past medical history, past social history, past surgical history and problem list.      Current Outpatient Medications:   •  aspirin 325 MG tablet, aspirin 325 mg tablet, Disp: , Rfl:   •  atorvastatin (LIPITOR) 20 MG tablet, Take 20 mg by mouth Every Night. Take 2 tablets every night for cholesterol, Disp: , Rfl:   •  Calcium Carb-Cholecalciferol (CALCIUM 600 + D) 600-200 MG-UNIT tablet, Take  by mouth., Disp: , Rfl:   •  docusate sodium (COLACE) 250 MG capsule, Take 200 mg by mouth Daily., Disp: , Rfl:   •  isosorbide mononitrate (IMDUR) 30 MG 24 hr tablet, Take 1 tablet by mouth Daily., Disp: 30 tablet, Rfl: 0  •  loperamide (IMODIUM) 2 MG capsule, Take 2 mg by mouth 4 (Four) Times a Day As Needed for Diarrhea., Disp: , Rfl:   •  Loratadine 10 MG capsule, Take  by mouth., Disp: , Rfl:   •  losartan (COZAAR) 25 MG tablet, Take 1 tablet by mouth Daily., Disp: 30 tablet, Rfl: 0  •  Omega-3 1000 MG capsule, Take 1,250 mg by mouth., Disp: , Rfl:   •  potassium citrate (UROCIT-K) 10 MEQ (1080 MG) CR tablet, Take 10 mEq by mouth Daily. States for kidney stones.  Ordered twice daily but states was only taken once daily, Disp: , Rfl:   •  ranolazine (RANEXA) 500 MG 12 hr tablet, Take 1 tablet by mouth 2 (Two) Times a Day., Disp: 60 tablet, Rfl: 0  •  traZODone (DESYREL) 100 MG tablet, Take 100 mg by mouth Every Night. States  for depression and sleep aide, Disp: , Rfl:   •  diazePAM (VALIUM) 2 MG tablet, Use 1/2 tablet at night, Disp: 10 tablet, Rfl: 0    Allergies   Allergen Reactions   • Codeine Palpitations and Rash   • Iodinated Diagnostic Agents Shortness Of Breath and Palpitations             Review of Systems   Constitutional: Negative for fever.   HENT: Positive for hearing loss. Negative for ear discharge and ear pain.    Respiratory: Negative.    Neurological: Positive for dizziness.   Hematological: Negative for adenopathy.           Objective   Physical Exam   Constitutional: She appears well-developed.   HENT:   Head: Normocephalic.   Right Ear: External ear and ear canal normal.   Left Ear: Tympanic membrane, external ear and ear canal normal.   Ears:    Nose: Nose normal.   Mouth/Throat: Oropharynx is clear and moist and mucous membranes are normal.   Eyes: Conjunctivae and EOM are normal.   Neck: Normal range of motion.   Pulmonary/Chest: Effort normal.   Musculoskeletal: Normal range of motion.   Neurological: She is alert.   Patient cannot tolerate much for  Dizziness exam that she had a fall and hurt her back she walks with a cane mainly is in a wheelchair here in the office she has no nystagmus    Is a states she has multiple orthopedic problems including shoulder and joint problems   Skin: Skin is warm.   Psychiatric: She has a normal mood and affect. Thought content normal.   Nursing note and vitals reviewed.      PT notes were reviewed    Assessment/Plan   Abby was seen today for follow-up.    Diagnoses and all orders for this visit:    Dizziness and giddiness    Sensorineural hearing loss, bilateral    Other orders  -     diazePAM (VALIUM) 2 MG tablet; Use 1/2 tablet at night    Questions symptoms her unusually resistant in terms of when she lies in bed and turning over.    Cousin this summer given her very low-dose diazepam only at night that helps her symptoms she'll only use it at night.  We'll see her back  in next few weeks to see if that's improving.  She's going to continue the therapy.  He may need formal balance testing reveal a neurology evaluation will see how she does with this approach first she is agreement with this all questions answered  She has postural hypotension systems and she's general her primary physician regarding this issue.  All questions were answered and we'll see her back as noted

## 2018-12-14 ENCOUNTER — APPOINTMENT (OUTPATIENT)
Dept: PHYSICAL THERAPY | Facility: HOSPITAL | Age: 78
End: 2018-12-14

## 2018-12-20 ENCOUNTER — TELEPHONE (OUTPATIENT)
Dept: PHYSICAL THERAPY | Facility: HOSPITAL | Age: 78
End: 2018-12-20

## 2018-12-20 NOTE — TELEPHONE ENCOUNTER
spoke with patient scheduling who spoke with patient on 12/11/18. Patient family member coming in to  patient to go out of state until january or february 2019. Patient will call when she gets back home.

## 2018-12-31 ENCOUNTER — APPOINTMENT (OUTPATIENT)
Dept: CARDIOLOGY | Facility: HOSPITAL | Age: 78
End: 2018-12-31

## 2018-12-31 ENCOUNTER — HOSPITAL ENCOUNTER (OUTPATIENT)
Facility: HOSPITAL | Age: 78
Setting detail: OBSERVATION
Discharge: HOME | End: 2019-01-01
Attending: FAMILY MEDICINE | Admitting: FAMILY MEDICINE

## 2018-12-31 DIAGNOSIS — I25.708 CORONARY ARTERY DISEASE INVOLVING CORONARY BYPASS GRAFT OF NATIVE HEART WITH OTHER FORMS OF ANGINA PECTORIS (HCC): Primary | ICD-10-CM

## 2018-12-31 DIAGNOSIS — I48.91 ATRIAL FIBRILLATION WITH RVR (HCC): ICD-10-CM

## 2018-12-31 DIAGNOSIS — I10 ESSENTIAL HYPERTENSION: ICD-10-CM

## 2018-12-31 PROBLEM — R77.8 ELEVATED TROPONIN: Status: ACTIVE | Noted: 2018-12-31

## 2018-12-31 LAB
ANION GAP SERPL CALCULATED.3IONS-SCNC: 12 MMOL/L (ref 5–15)
ARTICHOKE IGE QN: 53 MG/DL (ref 1–129)
BASOPHILS # BLD AUTO: 0.01 10*3/MM3 (ref 0–0.2)
BASOPHILS NFR BLD AUTO: 0.1 % (ref 0–2)
BH CV ECHO MEAS - ACS: 1.5 CM
BH CV ECHO MEAS - AO MAX PG (FULL): 8.5 MMHG
BH CV ECHO MEAS - AO MAX PG: 11.3 MMHG
BH CV ECHO MEAS - AO MEAN PG (FULL): 4 MMHG
BH CV ECHO MEAS - AO MEAN PG: 5 MMHG
BH CV ECHO MEAS - AO ROOT AREA (BSA CORRECTED): 2
BH CV ECHO MEAS - AO ROOT AREA: 7.5 CM^2
BH CV ECHO MEAS - AO ROOT DIAM: 3.1 CM
BH CV ECHO MEAS - AO V2 MAX: 168 CM/SEC
BH CV ECHO MEAS - AO V2 MEAN: 102 CM/SEC
BH CV ECHO MEAS - AO V2 VTI: 31.7 CM
BH CV ECHO MEAS - AVA(I,A): 1.8 CM^2
BH CV ECHO MEAS - AVA(I,D): 1.8 CM^2
BH CV ECHO MEAS - AVA(V,A): 1.6 CM^2
BH CV ECHO MEAS - AVA(V,D): 1.6 CM^2
BH CV ECHO MEAS - BSA(HAYCOCK): 1.7 M^2
BH CV ECHO MEAS - BSA: 1.6 M^2
BH CV ECHO MEAS - BZI_BMI: 37.9 KILOGRAMS/M^2
BH CV ECHO MEAS - BZI_METRIC_HEIGHT: 137.2 CM
BH CV ECHO MEAS - BZI_METRIC_WEIGHT: 71.2 KG
BH CV ECHO MEAS - EDV(CUBED): 88.7 ML
BH CV ECHO MEAS - EDV(TEICH): 90.5 ML
BH CV ECHO MEAS - EF(CUBED): 80 %
BH CV ECHO MEAS - EF(TEICH): 72.6 %
BH CV ECHO MEAS - ESV(CUBED): 17.8 ML
BH CV ECHO MEAS - ESV(TEICH): 24.8 ML
BH CV ECHO MEAS - FS: 41.5 %
BH CV ECHO MEAS - IVS/LVPW: 0.95
BH CV ECHO MEAS - IVSD: 1.3 CM
BH CV ECHO MEAS - LA DIMENSION: 4.1 CM
BH CV ECHO MEAS - LA/AO: 1.3
BH CV ECHO MEAS - LV MASS(C)D: 220.8 GRAMS
BH CV ECHO MEAS - LV MASS(C)DI: 141.5 GRAMS/M^2
BH CV ECHO MEAS - LV MAX PG: 2.8 MMHG
BH CV ECHO MEAS - LV MEAN PG: 1 MMHG
BH CV ECHO MEAS - LV V1 MAX: 84.2 CM/SEC
BH CV ECHO MEAS - LV V1 MEAN: 51.4 CM/SEC
BH CV ECHO MEAS - LV V1 VTI: 18.1 CM
BH CV ECHO MEAS - LVIDD: 4.5 CM
BH CV ECHO MEAS - LVIDS: 2.6 CM
BH CV ECHO MEAS - LVOT AREA (M): 3.1 CM^2
BH CV ECHO MEAS - LVOT AREA: 3.1 CM^2
BH CV ECHO MEAS - LVOT DIAM: 2 CM
BH CV ECHO MEAS - LVPWD: 1.3 CM
BH CV ECHO MEAS - MR MAX PG: 119.7 MMHG
BH CV ECHO MEAS - MR MAX VEL: 547 CM/SEC
BH CV ECHO MEAS - MV A MAX VEL: 93.5 CM/SEC
BH CV ECHO MEAS - MV DEC SLOPE: 589 CM/SEC^2
BH CV ECHO MEAS - MV E MAX VEL: 138 CM/SEC
BH CV ECHO MEAS - MV E/A: 1.5
BH CV ECHO MEAS - MV MAX PG: 8.8 MMHG
BH CV ECHO MEAS - MV MEAN PG: 2 MMHG
BH CV ECHO MEAS - MV P1/2T MAX VEL: 148 CM/SEC
BH CV ECHO MEAS - MV P1/2T: 73.6 MSEC
BH CV ECHO MEAS - MV V2 MAX: 148 CM/SEC
BH CV ECHO MEAS - MV V2 MEAN: 63 CM/SEC
BH CV ECHO MEAS - MV V2 VTI: 38.7 CM
BH CV ECHO MEAS - MVA P1/2T LCG: 1.5 CM^2
BH CV ECHO MEAS - MVA(P1/2T): 3 CM^2
BH CV ECHO MEAS - MVA(VTI): 1.5 CM^2
BH CV ECHO MEAS - PA MAX PG: 2.7 MMHG
BH CV ECHO MEAS - PA V2 MAX: 82.8 CM/SEC
BH CV ECHO MEAS - RAP SYSTOLE: 10 MMHG
BH CV ECHO MEAS - RVDD: 2.7 CM
BH CV ECHO MEAS - RVSP: 54.9 MMHG
BH CV ECHO MEAS - SI(AO): 153.3 ML/M^2
BH CV ECHO MEAS - SI(CUBED): 45.5 ML/M^2
BH CV ECHO MEAS - SI(LVOT): 36.4 ML/M^2
BH CV ECHO MEAS - SI(TEICH): 42.1 ML/M^2
BH CV ECHO MEAS - SV(AO): 239.3 ML
BH CV ECHO MEAS - SV(CUBED): 70.9 ML
BH CV ECHO MEAS - SV(LVOT): 56.9 ML
BH CV ECHO MEAS - SV(TEICH): 65.7 ML
BH CV ECHO MEAS - TR MAX VEL: 335 CM/SEC
BUN BLD-MCNC: 30 MG/DL (ref 7–21)
BUN/CREAT SERPL: 33 (ref 7–25)
CALCIUM SPEC-SCNC: 8.3 MG/DL (ref 8.4–10.2)
CHLORIDE SERPL-SCNC: 103 MMOL/L (ref 95–110)
CHOLEST SERPL-MCNC: 134 MG/DL (ref 0–199)
CO2 SERPL-SCNC: 21 MMOL/L (ref 22–31)
CREAT BLD-MCNC: 0.91 MG/DL (ref 0.5–1)
D-DIMER, QUANTITATIVE (MAD,POW, STR): 461 NG/ML (FEU) (ref 0–470)
DEPRECATED RDW RBC AUTO: 48.5 FL (ref 36.4–46.3)
EOSINOPHIL # BLD AUTO: 0.03 10*3/MM3 (ref 0–0.7)
EOSINOPHIL NFR BLD AUTO: 0.3 % (ref 0–7)
ERYTHROCYTE [DISTWIDTH] IN BLOOD BY AUTOMATED COUNT: 14.3 % (ref 11.5–14.5)
GFR SERPL CREATININE-BSD FRML MDRD: 60 ML/MIN/1.73 (ref 39–90)
GLUCOSE BLD-MCNC: 77 MG/DL (ref 60–100)
HBA1C MFR BLD: 5.9 % (ref 4–5.6)
HCT VFR BLD AUTO: 33.2 % (ref 35–45)
HDLC SERPL-MCNC: 62 MG/DL (ref 60–200)
HGB BLD-MCNC: 10.9 G/DL (ref 12–15.5)
HOLD SPECIMEN: NORMAL
IMM GRANULOCYTES # BLD AUTO: 0.06 10*3/MM3 (ref 0–0.02)
IMM GRANULOCYTES NFR BLD AUTO: 0.5 % (ref 0–0.5)
IRON 24H UR-MRATE: 19 MCG/DL (ref 37–170)
IRON SATN MFR SERPL: 6 % (ref 15–50)
LDLC/HDLC SERPL: 0.94 {RATIO} (ref 0–3.22)
LV EF 2D ECHO EST: 58 %
LYMPHOCYTES # BLD AUTO: 2.82 10*3/MM3 (ref 0.6–4.2)
LYMPHOCYTES NFR BLD AUTO: 25.3 % (ref 10–50)
MAGNESIUM SERPL-MCNC: 2.2 MG/DL (ref 1.6–2.3)
MAXIMAL PREDICTED HEART RATE: 141 BPM
MCH RBC QN AUTO: 30.4 PG (ref 26.5–34)
MCHC RBC AUTO-ENTMCNC: 32.8 G/DL (ref 31.4–36)
MCV RBC AUTO: 92.5 FL (ref 80–98)
MONOCYTES # BLD AUTO: 1.46 10*3/MM3 (ref 0–0.9)
MONOCYTES NFR BLD AUTO: 13.1 % (ref 0–12)
NEUTROPHILS # BLD AUTO: 6.75 10*3/MM3 (ref 2–8.6)
NEUTROPHILS NFR BLD AUTO: 60.7 % (ref 37–80)
NRBC BLD AUTO-RTO: 0 /100 WBC (ref 0–0)
PLATELET # BLD AUTO: 243 10*3/MM3 (ref 150–450)
PMV BLD AUTO: 9 FL (ref 8–12)
POTASSIUM BLD-SCNC: 3.6 MMOL/L (ref 3.5–5.1)
RBC # BLD AUTO: 3.59 10*6/MM3 (ref 3.77–5.16)
SODIUM BLD-SCNC: 136 MMOL/L (ref 137–145)
STRESS TARGET HR: 120 BPM
TIBC SERPL-MCNC: 340 MCG/DL (ref 265–497)
TRIGL SERPL-MCNC: 70 MG/DL (ref 20–199)
TROPONIN I SERPL-MCNC: 0.05 NG/ML
TROPONIN I SERPL-MCNC: 0.07 NG/ML
TROPONIN I SERPL-MCNC: 0.08 NG/ML
WBC NRBC COR # BLD: 11.13 10*3/MM3 (ref 3.2–9.8)
WHOLE BLOOD HOLD SPECIMEN: NORMAL

## 2018-12-31 PROCEDURE — 93306 TTE W/DOPPLER COMPLETE: CPT

## 2018-12-31 PROCEDURE — 83540 ASSAY OF IRON: CPT | Performed by: FAMILY MEDICINE

## 2018-12-31 PROCEDURE — 94799 UNLISTED PULMONARY SVC/PX: CPT

## 2018-12-31 PROCEDURE — 96365 THER/PROPH/DIAG IV INF INIT: CPT

## 2018-12-31 PROCEDURE — G0378 HOSPITAL OBSERVATION PER HR: HCPCS

## 2018-12-31 PROCEDURE — 83735 ASSAY OF MAGNESIUM: CPT | Performed by: FAMILY MEDICINE

## 2018-12-31 PROCEDURE — 93306 TTE W/DOPPLER COMPLETE: CPT | Performed by: INTERNAL MEDICINE

## 2018-12-31 PROCEDURE — 94640 AIRWAY INHALATION TREATMENT: CPT

## 2018-12-31 PROCEDURE — 83036 HEMOGLOBIN GLYCOSYLATED A1C: CPT | Performed by: FAMILY MEDICINE

## 2018-12-31 PROCEDURE — 96366 THER/PROPH/DIAG IV INF ADDON: CPT

## 2018-12-31 PROCEDURE — 85025 COMPLETE CBC W/AUTO DIFF WBC: CPT | Performed by: FAMILY MEDICINE

## 2018-12-31 PROCEDURE — 80061 LIPID PANEL: CPT | Performed by: FAMILY MEDICINE

## 2018-12-31 PROCEDURE — 93005 ELECTROCARDIOGRAM TRACING: CPT | Performed by: FAMILY MEDICINE

## 2018-12-31 PROCEDURE — 99204 OFFICE O/P NEW MOD 45 MIN: CPT | Performed by: NURSE PRACTITIONER

## 2018-12-31 PROCEDURE — 93010 ELECTROCARDIOGRAM REPORT: CPT | Performed by: INTERNAL MEDICINE

## 2018-12-31 PROCEDURE — 80048 BASIC METABOLIC PNL TOTAL CA: CPT | Performed by: FAMILY MEDICINE

## 2018-12-31 PROCEDURE — G0379 DIRECT REFER HOSPITAL OBSERV: HCPCS

## 2018-12-31 PROCEDURE — 63710000001 PREDNISONE PER 1 MG: Performed by: FAMILY MEDICINE

## 2018-12-31 PROCEDURE — 93005 ELECTROCARDIOGRAM TRACING: CPT | Performed by: NURSE PRACTITIONER

## 2018-12-31 PROCEDURE — 84484 ASSAY OF TROPONIN QUANT: CPT | Performed by: FAMILY MEDICINE

## 2018-12-31 PROCEDURE — 85379 FIBRIN DEGRADATION QUANT: CPT | Performed by: FAMILY MEDICINE

## 2018-12-31 PROCEDURE — 94760 N-INVAS EAR/PLS OXIMETRY 1: CPT

## 2018-12-31 PROCEDURE — 83550 IRON BINDING TEST: CPT | Performed by: FAMILY MEDICINE

## 2018-12-31 RX ORDER — SODIUM CHLORIDE 0.9 % (FLUSH) 0.9 %
3-10 SYRINGE (ML) INJECTION AS NEEDED
Status: DISCONTINUED | OUTPATIENT
Start: 2018-12-31 | End: 2018-12-31

## 2018-12-31 RX ORDER — CARVEDILOL 3.12 MG/1
3.12 TABLET ORAL EVERY 12 HOURS
COMMUNITY

## 2018-12-31 RX ORDER — TRAZODONE HYDROCHLORIDE 100 MG/1
100 TABLET ORAL NIGHTLY
Status: DISCONTINUED | OUTPATIENT
Start: 2018-12-31 | End: 2019-01-01 | Stop reason: HOSPADM

## 2018-12-31 RX ORDER — SODIUM CHLORIDE 0.9 % (FLUSH) 0.9 %
10 SYRINGE (ML) INJECTION AS NEEDED
Status: DISCONTINUED | OUTPATIENT
Start: 2018-12-31 | End: 2019-01-01 | Stop reason: HOSPADM

## 2018-12-31 RX ORDER — FAMOTIDINE 40 MG/1
40 TABLET, FILM COATED ORAL DAILY
Status: DISCONTINUED | OUTPATIENT
Start: 2018-12-31 | End: 2019-01-01 | Stop reason: HOSPADM

## 2018-12-31 RX ORDER — ISOSORBIDE MONONITRATE 30 MG/1
30 TABLET, EXTENDED RELEASE ORAL
Status: DISCONTINUED | OUTPATIENT
Start: 2018-12-31 | End: 2019-01-01 | Stop reason: HOSPADM

## 2018-12-31 RX ORDER — NITROGLYCERIN 0.4 MG/1
0.4 TABLET SUBLINGUAL
Status: DISCONTINUED | OUTPATIENT
Start: 2018-12-31 | End: 2019-01-01 | Stop reason: HOSPADM

## 2018-12-31 RX ORDER — AZITHROMYCIN 250 MG/1
250 TABLET, FILM COATED ORAL DAILY
COMMUNITY
Start: 2018-12-31 | End: 2019-01-01 | Stop reason: HOSPADM

## 2018-12-31 RX ORDER — AMIODARONE HYDROCHLORIDE 200 MG/1
200 TABLET ORAL EVERY 12 HOURS SCHEDULED
Status: DISCONTINUED | OUTPATIENT
Start: 2018-12-31 | End: 2019-01-01 | Stop reason: HOSPADM

## 2018-12-31 RX ORDER — SODIUM CHLORIDE 0.9 % (FLUSH) 0.9 %
10 SYRINGE (ML) INJECTION EVERY 12 HOURS SCHEDULED
Status: DISCONTINUED | OUTPATIENT
Start: 2018-12-31 | End: 2019-01-01 | Stop reason: HOSPADM

## 2018-12-31 RX ORDER — MORPHINE SULFATE 2 MG/ML
2 INJECTION, SOLUTION INTRAMUSCULAR; INTRAVENOUS
Status: DISCONTINUED | OUTPATIENT
Start: 2018-12-31 | End: 2019-01-01 | Stop reason: HOSPADM

## 2018-12-31 RX ORDER — IPRATROPIUM BROMIDE AND ALBUTEROL SULFATE 2.5; .5 MG/3ML; MG/3ML
3 SOLUTION RESPIRATORY (INHALATION)
Status: DISCONTINUED | OUTPATIENT
Start: 2018-12-31 | End: 2019-01-01 | Stop reason: HOSPADM

## 2018-12-31 RX ORDER — PREDNISONE 20 MG/1
40 TABLET ORAL
Status: DISCONTINUED | OUTPATIENT
Start: 2018-12-31 | End: 2019-01-01 | Stop reason: HOSPADM

## 2018-12-31 RX ORDER — NYSTATIN 100000 [USP'U]/G
POWDER TOPICAL EVERY 12 HOURS SCHEDULED
Status: DISCONTINUED | OUTPATIENT
Start: 2018-12-31 | End: 2019-01-01 | Stop reason: HOSPADM

## 2018-12-31 RX ORDER — SODIUM CHLORIDE 0.9 % (FLUSH) 0.9 %
3 SYRINGE (ML) INJECTION EVERY 12 HOURS SCHEDULED
Status: DISCONTINUED | OUTPATIENT
Start: 2018-12-31 | End: 2018-12-31

## 2018-12-31 RX ORDER — RANOLAZINE 500 MG/1
500 TABLET, EXTENDED RELEASE ORAL EVERY 12 HOURS SCHEDULED
Status: DISCONTINUED | OUTPATIENT
Start: 2018-12-31 | End: 2019-01-01 | Stop reason: HOSPADM

## 2018-12-31 RX ORDER — PREDNISONE 50 MG/1
50 TABLET ORAL DAILY
COMMUNITY

## 2018-12-31 RX ORDER — ATORVASTATIN CALCIUM 20 MG/1
20 TABLET, FILM COATED ORAL NIGHTLY
Status: DISCONTINUED | OUTPATIENT
Start: 2018-12-31 | End: 2019-01-01 | Stop reason: HOSPADM

## 2018-12-31 RX ORDER — DOCUSATE SODIUM 100 MG/1
200 CAPSULE, LIQUID FILLED ORAL DAILY
Status: DISCONTINUED | OUTPATIENT
Start: 2018-12-31 | End: 2019-01-01 | Stop reason: HOSPADM

## 2018-12-31 RX ORDER — LOSARTAN POTASSIUM 25 MG/1
25 TABLET ORAL NIGHTLY
Status: DISCONTINUED | OUTPATIENT
Start: 2018-12-31 | End: 2019-01-01 | Stop reason: HOSPADM

## 2018-12-31 RX ADMIN — ATORVASTATIN CALCIUM 20 MG: 20 TABLET, FILM COATED ORAL at 21:04

## 2018-12-31 RX ADMIN — LOSARTAN POTASSIUM 25 MG: 25 TABLET, FILM COATED ORAL at 21:03

## 2018-12-31 RX ADMIN — IPRATROPIUM BROMIDE AND ALBUTEROL SULFATE 3 ML: 2.5; .5 SOLUTION RESPIRATORY (INHALATION) at 21:08

## 2018-12-31 RX ADMIN — RANOLAZINE 500 MG: 500 TABLET, FILM COATED, EXTENDED RELEASE ORAL at 21:03

## 2018-12-31 RX ADMIN — SODIUM CHLORIDE, PRESERVATIVE FREE 10 ML: 5 INJECTION INTRAVENOUS at 08:48

## 2018-12-31 RX ADMIN — PREDNISONE 40 MG: 20 TABLET ORAL at 08:48

## 2018-12-31 RX ADMIN — RIVAROXABAN 20 MG: 10 TABLET, FILM COATED ORAL at 17:10

## 2018-12-31 RX ADMIN — NYSTATIN: 100000 POWDER TOPICAL at 21:04

## 2018-12-31 RX ADMIN — SODIUM CHLORIDE, PRESERVATIVE FREE 10 ML: 5 INJECTION INTRAVENOUS at 21:04

## 2018-12-31 RX ADMIN — NYSTATIN: 100000 POWDER TOPICAL at 13:04

## 2018-12-31 RX ADMIN — TRAZODONE HYDROCHLORIDE 100 MG: 100 TABLET ORAL at 21:04

## 2018-12-31 RX ADMIN — DILTIAZEM HYDROCHLORIDE 5 MG/HR: 5 INJECTION INTRAVENOUS at 09:51

## 2018-12-31 RX ADMIN — DRONEDARONE 400 MG: 400 TABLET, FILM COATED ORAL at 08:48

## 2018-12-31 RX ADMIN — FAMOTIDINE 40 MG: 40 TABLET ORAL at 08:48

## 2018-12-31 RX ADMIN — DOCUSATE SODIUM 200 MG: 100 CAPSULE, LIQUID FILLED ORAL at 08:48

## 2018-12-31 RX ADMIN — IPRATROPIUM BROMIDE AND ALBUTEROL SULFATE 3 ML: 2.5; .5 SOLUTION RESPIRATORY (INHALATION) at 13:00

## 2018-12-31 RX ADMIN — NITROGLYCERIN 0.4 MG: 0.4 TABLET, ORALLY DISINTEGRATING SUBLINGUAL at 09:36

## 2018-12-31 RX ADMIN — AMIODARONE HYDROCHLORIDE 200 MG: 200 TABLET ORAL at 15:58

## 2018-12-31 RX ADMIN — RANOLAZINE 500 MG: 500 TABLET, FILM COATED, EXTENDED RELEASE ORAL at 08:48

## 2018-12-31 RX ADMIN — ISOSORBIDE MONONITRATE 30 MG: 30 TABLET, EXTENDED RELEASE ORAL at 11:07

## 2019-01-01 VITALS
HEART RATE: 68 BPM | OXYGEN SATURATION: 96 % | HEIGHT: 55 IN | SYSTOLIC BLOOD PRESSURE: 138 MMHG | WEIGHT: 154 LBS | RESPIRATION RATE: 18 BRPM | DIASTOLIC BLOOD PRESSURE: 63 MMHG | BODY MASS INDEX: 35.64 KG/M2 | TEMPERATURE: 96.6 F

## 2019-01-01 LAB
ANION GAP SERPL CALCULATED.3IONS-SCNC: 11 MMOL/L (ref 5–15)
BASOPHILS # BLD AUTO: 0.01 10*3/MM3 (ref 0–0.2)
BASOPHILS NFR BLD AUTO: 0.1 % (ref 0–2)
BUN BLD-MCNC: 21 MG/DL (ref 7–21)
BUN/CREAT SERPL: 26.9 (ref 7–25)
CALCIUM SPEC-SCNC: 8.4 MG/DL (ref 8.4–10.2)
CHLORIDE SERPL-SCNC: 104 MMOL/L (ref 95–110)
CO2 SERPL-SCNC: 23 MMOL/L (ref 22–31)
CREAT BLD-MCNC: 0.78 MG/DL (ref 0.5–1)
DEPRECATED RDW RBC AUTO: 45.4 FL (ref 36.4–46.3)
EOSINOPHIL # BLD AUTO: 0 10*3/MM3 (ref 0–0.7)
EOSINOPHIL NFR BLD AUTO: 0 % (ref 0–7)
ERYTHROCYTE [DISTWIDTH] IN BLOOD BY AUTOMATED COUNT: 13.7 % (ref 11.5–14.5)
GFR SERPL CREATININE-BSD FRML MDRD: 71 ML/MIN/1.73 (ref 39–90)
GLUCOSE BLD-MCNC: 77 MG/DL (ref 60–100)
HCT VFR BLD AUTO: 31.1 % (ref 35–45)
HGB BLD-MCNC: 10.5 G/DL (ref 12–15.5)
IMM GRANULOCYTES # BLD AUTO: 0.05 10*3/MM3 (ref 0–0.02)
IMM GRANULOCYTES NFR BLD AUTO: 0.5 % (ref 0–0.5)
LYMPHOCYTES # BLD AUTO: 2.13 10*3/MM3 (ref 0.6–4.2)
LYMPHOCYTES NFR BLD AUTO: 21.9 % (ref 10–50)
MCH RBC QN AUTO: 30.3 PG (ref 26.5–34)
MCHC RBC AUTO-ENTMCNC: 33.8 G/DL (ref 31.4–36)
MCV RBC AUTO: 89.6 FL (ref 80–98)
MONOCYTES # BLD AUTO: 0.91 10*3/MM3 (ref 0–0.9)
MONOCYTES NFR BLD AUTO: 9.4 % (ref 0–12)
NEUTROPHILS # BLD AUTO: 6.63 10*3/MM3 (ref 2–8.6)
NEUTROPHILS NFR BLD AUTO: 68.1 % (ref 37–80)
PLATELET # BLD AUTO: 234 10*3/MM3 (ref 150–450)
PMV BLD AUTO: 9.5 FL (ref 8–12)
POTASSIUM BLD-SCNC: 3.5 MMOL/L (ref 3.5–5.1)
RBC # BLD AUTO: 3.47 10*6/MM3 (ref 3.77–5.16)
SODIUM BLD-SCNC: 138 MMOL/L (ref 137–145)
WBC NRBC COR # BLD: 9.73 10*3/MM3 (ref 3.2–9.8)

## 2019-01-01 PROCEDURE — 94760 N-INVAS EAR/PLS OXIMETRY 1: CPT

## 2019-01-01 PROCEDURE — 93010 ELECTROCARDIOGRAM REPORT: CPT | Performed by: INTERNAL MEDICINE

## 2019-01-01 PROCEDURE — 63710000001 PREDNISONE PER 1 MG: Performed by: NURSE PRACTITIONER

## 2019-01-01 PROCEDURE — 93005 ELECTROCARDIOGRAM TRACING: CPT | Performed by: NURSE PRACTITIONER

## 2019-01-01 PROCEDURE — 94799 UNLISTED PULMONARY SVC/PX: CPT

## 2019-01-01 PROCEDURE — 85025 COMPLETE CBC W/AUTO DIFF WBC: CPT | Performed by: NURSE PRACTITIONER

## 2019-01-01 PROCEDURE — 99214 OFFICE O/P EST MOD 30 MIN: CPT | Performed by: NURSE PRACTITIONER

## 2019-01-01 PROCEDURE — 80048 BASIC METABOLIC PNL TOTAL CA: CPT | Performed by: NURSE PRACTITIONER

## 2019-01-01 PROCEDURE — G0378 HOSPITAL OBSERVATION PER HR: HCPCS

## 2019-01-01 RX ORDER — AMIODARONE HYDROCHLORIDE 200 MG/1
TABLET ORAL
Qty: 60 TABLET | Refills: 0 | Status: SHIPPED | OUTPATIENT
Start: 2019-01-01 | End: 2019-02-07

## 2019-01-01 RX ORDER — AMIODARONE HYDROCHLORIDE 200 MG/1
200 TABLET ORAL EVERY 12 HOURS SCHEDULED
Qty: 60 TABLET | Refills: 0 | Status: SHIPPED | OUTPATIENT
Start: 2019-01-01 | End: 2019-01-01

## 2019-01-01 RX ADMIN — IPRATROPIUM BROMIDE AND ALBUTEROL SULFATE 3 ML: 2.5; .5 SOLUTION RESPIRATORY (INHALATION) at 08:06

## 2019-01-01 RX ADMIN — PREDNISONE 40 MG: 20 TABLET ORAL at 08:33

## 2019-01-01 RX ADMIN — DOCUSATE SODIUM 200 MG: 100 CAPSULE, LIQUID FILLED ORAL at 08:33

## 2019-01-01 RX ADMIN — ISOSORBIDE MONONITRATE 30 MG: 30 TABLET, EXTENDED RELEASE ORAL at 08:33

## 2019-01-01 RX ADMIN — NYSTATIN: 100000 POWDER TOPICAL at 08:35

## 2019-01-01 RX ADMIN — AMIODARONE HYDROCHLORIDE 200 MG: 200 TABLET ORAL at 08:33

## 2019-01-01 RX ADMIN — SODIUM CHLORIDE, PRESERVATIVE FREE 10 ML: 5 INJECTION INTRAVENOUS at 08:34

## 2019-01-01 RX ADMIN — RANOLAZINE 500 MG: 500 TABLET, FILM COATED, EXTENDED RELEASE ORAL at 08:33

## 2019-01-01 RX ADMIN — FAMOTIDINE 40 MG: 40 TABLET ORAL at 08:33

## 2019-01-01 NOTE — PLAN OF CARE
Problem: Patient Care Overview  Goal: Plan of Care Review  Outcome: Ongoing (interventions implemented as appropriate)    Goal: Individualization and Mutuality  Outcome: Ongoing (interventions implemented as appropriate)    Goal: Discharge Needs Assessment  Outcome: Ongoing (interventions implemented as appropriate)      Problem: Fall Risk (Adult)  Goal: Absence of Fall  Outcome: Ongoing (interventions implemented as appropriate)      Problem: Arrhythmia/Dysrhythmia (Symptomatic) (Adult)  Goal: Signs and Symptoms of Listed Potential Problems Will be Absent, Minimized or Managed (Arrhythmia/Dysrhythmia)  Outcome: Ongoing (interventions implemented as appropriate)      Problem: Skin Injury Risk (Adult)  Goal: Skin Health and Integrity  Outcome: Ongoing (interventions implemented as appropriate)      Problem: Chronic Obstructive Pulmonary Disease (Adult)  Goal: Signs and Symptoms of Listed Potential Problems Will be Absent, Minimized or Managed (Chronic Obstructive Pulmonary Disease)  Outcome: Ongoing (interventions implemented as appropriate)      Problem: Anemia (Adult)  Goal: Symptom Improvement  Outcome: Ongoing (interventions implemented as appropriate)      Problem: Cardiac: ACS (Acute Coronary Syndrome) (Adult)  Goal: Signs and Symptoms of Listed Potential Problems Will be Absent, Minimized or Managed (Cardiac: ACS)  Outcome: Ongoing (interventions implemented as appropriate)

## 2019-01-01 NOTE — NURSING NOTE
Upon entering room to assist pt exam light was turned on and noticed a but in the bed. Upon further inspection numerous bugs noted to be crawling on bed. One collected in speciman cup to send to lab to confirm it is a bed bug. Keegan JACKSON and Heidi Jones house supervisor and Shayla Eli Clinical Leader all notified.

## 2019-01-01 NOTE — DISCHARGE SUMMARY
"    [facility] Medicine Services  DISCHARGE SUMMARY       Date of Admission: 12/31/2018  Date of Discharge:  1/1/2019  Primary Care Physician: Kelly Kee MD    Presenting Problem/History of Present Illness:  Atrial fibrillation with RVR (CMS/[facility]) [I48.91]     Final Discharge Diagnoses:    Atrial fibrillation with RVR (CMS/[facility])    CAD (coronary artery disease)    COPD (chronic obstructive pulmonary disease) (CMS/[facility])    HTN (hypertension)    Elevated troponin      Consults:   Consults     Date and Time Order Name Status Description    12/31/2018 0655 Inpatient Cardiology Consult Completed           HPI/Hospital Course:  The patient is a 78 y.o. female with a history of CAD s/p CABG, PAF, HTN, and COPD who presented to [facility] from outside hospital with chest pain.  She was found to be in atrial fibrillation with RVR and was initiated on a Cardizem drip.  She converted spontaneously and was restarted on Multaq and Coreg.  She again developed atrial fibrillation with RVR.  Multaq is discontinued and she resumed on cardizem infusion.  She again spontaneously converted to normal sinus rhythm and was initiated on PO amiodarone.  She remains chronically anticoagulated with Xarelto.  Cardiology consulted and currently recommends medical management of CAD.  She was cleared by cardiology for discharge.  She is stable and discharged to home.      Condition on Discharge:  Stable    Physical Exam on Discharge:  /63 (BP Location: Left arm, Patient Position: Lying)   Pulse 68   Temp 96.6 °F (35.9 °C) (Oral)   Resp 18   Ht 137.2 cm (54\")   Wt 69.9 kg (154 lb)   LMP  (LMP Unknown)   SpO2 96%   BMI 37.13 kg/m²   Physical Exam   Constitutional: She is oriented to person, place, and time. She appears well-developed and well-nourished.   HENT:   Head: Normocephalic.   Eyes: Conjunctivae are normal.   Cardiovascular: Normal rate, regular rhythm, " normal heart sounds and intact distal pulses.   Pulmonary/Chest: Effort normal and breath sounds normal. No respiratory distress.   Abdominal: Soft. Bowel sounds are normal. She exhibits no distension. There is no tenderness.   Musculoskeletal: Normal range of motion. She exhibits no edema.   Neurological: She is alert and oriented to person, place, and time.   Skin: Skin is warm and dry. She is not diaphoretic. No erythema.   Vitals reviewed.        Discharge Disposition:  Home or Self Care    Discharge Medications:     Discharge Medications      New Medications      Instructions Start Date   amiodarone 200 MG tablet  Commonly known as:  PACERONE   Take 1 tablet by mouth Every 12 (Twelve) Hours for 7 days, THEN 1 tablet Daily for 30 days.   Start Date:  1/1/2019        Changes to Medications      Instructions Start Date   losartan 25 MG tablet  Commonly known as:  COZAAR  What changed:  when to take this   25 mg, Oral, Daily         Continue These Medications      Instructions Start Date   aspirin 325 MG tablet   aspirin 325 mg tablet      atorvastatin 20 MG tablet  Commonly known as:  LIPITOR   20 mg, Oral, Nightly, Take 2 tablets every night for cholesterol       CALCIUM 600 + D 600-200 MG-UNIT tablet  Generic drug:  Calcium Carb-Cholecalciferol   Oral, Daily      carvedilol 3.125 MG tablet  Commonly known as:  COREG   3.125 mg, Oral, Every 12 Hours      docusate sodium 250 MG capsule  Commonly known as:  COLACE   200 mg, Oral, Daily      isosorbide mononitrate 30 MG 24 hr tablet  Commonly known as:  IMDUR   30 mg, Oral, Daily      Loratadine 10 MG capsule   Oral, Daily      Omega-3 1000 MG capsule   1,250 mg, Oral, Daily      potassium citrate 10 MEQ (1080 MG) CR tablet  Commonly known as:  UROCIT-K   10 mEq, Oral, 2 Times Daily, States for kidney stones.  Ordered twice daily but states was only taken once daily      predniSONE 50 MG tablet  Commonly known as:  DELTASONE   50 mg, Oral, Daily      ranolazine 500  MG 12 hr tablet  Commonly known as:  RANEXA   500 mg, Oral, 2 Times Daily      rivaroxaban 20 MG tablet  Commonly known as:  XARELTO   20 mg, Oral, Daily      traZODone 100 MG tablet  Commonly known as:  DESYREL   100 mg, Oral, Nightly, States for depression and sleep aide          Stop These Medications    azithromycin 250 MG tablet  Commonly known as:  ZITHROMAX     dronedarone 400 MG tablet  Commonly known as:  MULTAQ     loperamide 2 MG capsule  Commonly known as:  IMODIUM            Discharge Diet:   Diet Instructions     Diet: Regular, Cardiac, Consistent Carbohydrate; Thin      Discharge Diet:   Regular  Cardiac  Consistent Carbohydrate       Fluid Consistency:  Thin          Activity at Discharge:   Activity Instructions     Activity as Tolerated            Follow-up Appointments:   1. PCP 1 week  2. Dr. Orellana, Cardiology, 2-4 weeks    Keegan Benoit, MANUEL  01/01/19  2:15 PM    Time: Discharge planning and teaching took greater than 30 minutes.

## 2019-01-01 NOTE — PROGRESS NOTES
"[facility] Cardiology Progress Note   LOS: 0 days   Patient Care Team:  Kelly Kee MD as PCP - General (Internal Medicine)    Chief Complaint: AF     Subjective     Interval History:   Patient Denies: shortness of air, chest pain, PND, dizziness, syncope and edema  Patient Complaints: no complaints today  History taken from: patient     No events overnight. Remained NSR with controlled rate (thu at times). DC home today.     Objective     Vital Sign Min/Max for last 24 hours  Temp  Min: 96.6 °F (35.9 °C)  Max: 97.6 °F (36.4 °C)   BP  Min: 138/63  Max: 160/80   Pulse  Min: 57  Max: 71   Resp  Min: 16  Max: 20   SpO2  Min: 94 %  Max: 98 %   Flow (L/min)  Min: 2  Max: 2   Weight  Min: 69.9 kg (154 lb)  Max: 69.9 kg (154 lb)     Flowsheet Rows      First Filed Value   Admission Height  137.2 cm (54\") Documented at 12/31/2018 0525   Admission Weight  71.5 kg (157 lb 11.2 oz) Documented at 12/31/2018 0525            12/31/18  0525 01/01/19  0415   Weight: 71.5 kg (157 lb 11.2 oz) 69.9 kg (154 lb)       Physical Exam:  Physical Exam   Constitutional: She is oriented to person, place, and time. She appears well-developed and well-nourished. No distress.   HENT:   Head: Normocephalic.   Eyes: Conjunctivae are normal.   Neck: No JVD present.   Cardiovascular: Normal rate, regular rhythm, S1 normal, S2 normal, normal heart sounds and intact distal pulses. Exam reveals no gallop and no friction rub.   No murmur heard.  Pulmonary/Chest: Effort normal and breath sounds normal. No respiratory distress. She has no wheezes. She has no rales.   Abdominal: Soft. Bowel sounds are normal. She exhibits no distension.   Musculoskeletal: Normal range of motion. She exhibits no edema.   Neurological: She is alert and oriented to person, place, and time.   Skin: Skin is warm and dry. She is not diaphoretic. No erythema.   Psychiatric: She has a normal mood and affect. Her behavior is normal. Judgment and thought content normal. "   Nursing note and vitals reviewed.       Results Review:     Results from last 7 days   Lab Units  01/01/19   0609  12/31/18   0728   SODIUM mmol/L  138  136*   POTASSIUM mmol/L  3.5  3.6   CHLORIDE mmol/L  104  103   CO2 mmol/L  23.0  21.0*   BUN mg/dL  21  30*   CREATININE mg/dL  0.78  0.91   CALCIUM mg/dL  8.4  8.3*   GLUCOSE mg/dL  77  77       Estimated Creatinine Clearance: 47.3 mL/min (by C-G formula based on SCr of 0.78 mg/dL).    Results from last 7 days   Lab Units  12/31/18   0728   MAGNESIUM mg/dL  2.2             Results from last 7 days   Lab Units  01/01/19   0609  12/31/18   0728   WBC 10*3/mm3  9.73  11.13*   HEMOGLOBIN g/dL  10.5*  10.9*   PLATELETS 10*3/mm3  234  243           Lab Results   Component Value Date    PROBNP 2,670.0 (H) 03/24/2017       I/O last 3 completed shifts:  In: 1301.5 [P.O.:1280; I.V.:21.5]  Out: 1250 [Urine:1250]    Cardiographics:      Results for orders placed during the hospital encounter of 12/31/18   Adult Transthoracic Echo Complete W/ Cont if Necessary Per Protocol    Narrative · Left ventricular wall thickness is consistent with mild concentric   hypertrophy.  · Estimated EF = 58%.  · Left ventricular systolic function is normal.  · Left ventricular diastolic dysfunction (grade I) consistent with   impaired relaxation.  · Left atrial cavity size is mildly dilated.  · Estimated right ventricular systolic pressure from tricuspid   regurgitation is moderately elevated (45-55 mmHg).  · The study is technically difficult for diagnosis.          No radiology results for the last 30 days.    Medication Review:     Current Facility-Administered Medications:   •  amiodarone (PACERONE) tablet 200 mg, 200 mg, Oral, Q12H, Jennifer Gomez MD, 200 mg at 01/01/19 0833  •  atorvastatin (LIPITOR) tablet 20 mg, 20 mg, Oral, Nightly, Danny Skelton DO, 20 mg at 12/31/18 2104  •  docusate sodium (COLACE) capsule 200 mg, 200 mg, Oral, Daily, Danny Skelton DO,  200 mg at 01/01/19 0833  •  famotidine (PEPCID) tablet 40 mg, 40 mg, Oral, Daily, Danny Skelton DO, 40 mg at 01/01/19 0833  •  ipratropium-albuterol (DUO-NEB) nebulizer solution 3 mL, 3 mL, Nebulization, 4x Daily - RT, Danny Skelton DO, 3 mL at 01/01/19 0806  •  isosorbide mononitrate (IMDUR) 24 hr tablet 30 mg, 30 mg, Oral, Q24H, Yara Faulkner APRN, 30 mg at 01/01/19 0833  •  losartan (COZAAR) tablet 25 mg, 25 mg, Oral, Nightly, Yara Faulkner APRN, 25 mg at 12/31/18 2103  •  morphine injection 2 mg, 2 mg, Intravenous, Q2H PRN, Keegan Benoit APRN  •  nitroglycerin (NITROSTAT) SL tablet 0.4 mg, 0.4 mg, Sublingual, Q5 Min PRN, Danny Skelton DO, 0.4 mg at 12/31/18 0936  •  nystatin (MYCOSTATIN) powder, , Topical, Q12H, Keegan Benoit APRN  •  predniSONE (DELTASONE) tablet 40 mg, 40 mg, Oral, Daily With Breakfast, Keegan Benoit APRN, 40 mg at 01/01/19 0833  •  ranolazine (RANEXA) 12 hr tablet 500 mg, 500 mg, Oral, Q12H, Danny Skelton DO, 500 mg at 01/01/19 0833  •  rivaroxaban (XARELTO) tablet 20 mg, 20 mg, Oral, Daily With Dinner, Danny Skelton DO, 20 mg at 12/31/18 1710  •  sodium chloride 0.9 % flush 10 mL, 10 mL, Intravenous, Q12H, Danny Skelton DO, 10 mL at 01/01/19 0834  •  sodium chloride 0.9 % flush 10 mL, 10 mL, Intravenous, PRN, Danny Skelton DO  •  traZODone (DESYREL) tablet 100 mg, 100 mg, Oral, Nightly, Danny Skelton DO, 100 mg at 12/31/18 4628    Assessment/Plan       Atrial fibrillation with RVR (CMS/HCC)    CAD (coronary artery disease)    COPD (chronic obstructive pulmonary disease) (CMS/HCC)    HTN (hypertension)    Elevated troponin    Atrial fibrillation chronic - 1 year or more  CHADSVASC: HTN, DM, Age >75 (2) and Female  EF is55%. Mild evidence for LVH. LA size is mildly enlarged.    Anticoagulation:ordered  - Xarelto 20mg   Rate Control:   DC Multaq and begin Amiodarone  200mg BID due to ineffectiveness.   Coreg 3.125mg BID      Plan for disposition:Where: home today. Follow up with [facility]ologist          This document has been electronically signed by MANUEL Calderon on January 1, 2019 12:08 PM

## 2019-03-06 ENCOUNTER — DOCUMENTATION (OUTPATIENT)
Dept: PHYSICAL THERAPY | Facility: HOSPITAL | Age: 79
End: 2019-03-06

## 2019-03-06 DIAGNOSIS — H81.11 BPPV (BENIGN PAROXYSMAL POSITIONAL VERTIGO), RIGHT: ICD-10-CM

## 2019-03-06 DIAGNOSIS — R42 DIZZINESS AND GIDDINESS: Primary | ICD-10-CM

## (undated) DEVICE — CATH DIAG EXPO M/ PK 6FR FL4/FR4 PIG 3PK

## (undated) DEVICE — MODEL BT2000 P/N 700287-012KIT CONTENTS: MANIFOLD WITH SALINE AND CONTRAST PORTS, SALINE TUBING WITH SPIKE AND HAND SYRINGE, TRANSDUCER: Brand: BT2000 AUTOMATED MANIFOLD KIT

## (undated) DEVICE — GW PERIPH GUIDERIGHT STD/J/TP PTFE/PCOAT SS 0.038IN 5X150CM

## (undated) DEVICE — HI-TORQUE VERSACORE FLOPPY GUIDE WIRE SYSTEM 260 CM: Brand: HI-TORQUE VERSACORE

## (undated) DEVICE — INTRO SHEATH ART/FEM ENGAGE .038 6F12CM

## (undated) DEVICE — VSI MICRO-INTRODUCER KITS ARE INTENDED FOR USE IN PERCUTANEOUS INTRODUCTION OF UP TO A 0.018 INCH OR 0.038 INCH GUIDEWIRE OR CATHETER INTO THE VASCULAR SYSTEM FOLLOWING A SMALL GAUGE NEEDLE STICK.: Brand: VSI MICRO-INTRODUCER KIT

## (undated) DEVICE — PK CATH LAB 60

## (undated) DEVICE — A2000 MULTI-USE SYRINGE KIT, P/N 701277-003KIT CONTENTS: 100ML CONTRAST RESERVOIR AND TUBING WITH CONTRAST SPIKE AND CLAMP: Brand: A2000 MULTI-USE SYRINGE KIT

## (undated) DEVICE — MODEL AT P65, P/N 701554-001KIT CONTENTS: HAND CONTROLLER, 3-WAY HIGH-PRESSURE STOPCOCK WITH ROTATING END AND PREMIUM HIGH-PRESSURE TUBING: Brand: ANGIOTOUCH® KIT

## (undated) DEVICE — MYNXGRIP 6F/7F: Brand: MYNXGRIP

## (undated) DEVICE — ELECTRODE,RT,STRESS,FOAM,50PK: Brand: MEDLINE